# Patient Record
Sex: MALE | Race: WHITE | Employment: FULL TIME | RURAL
[De-identification: names, ages, dates, MRNs, and addresses within clinical notes are randomized per-mention and may not be internally consistent; named-entity substitution may affect disease eponyms.]

---

## 2017-01-11 ENCOUNTER — PATIENT OUTREACH (OUTPATIENT)
Dept: FAMILY MEDICINE CLINIC | Age: 32
End: 2017-01-11

## 2017-04-03 RX ORDER — METFORMIN HYDROCHLORIDE 500 MG/1
TABLET ORAL
Qty: 60 TAB | Refills: 0 | OUTPATIENT
Start: 2017-04-03

## 2017-04-03 NOTE — TELEPHONE ENCOUNTER
Patients spouse called and said he was supposed to come back in November but they completely forgot and now he has ran out of his Metformin. I explained since Dr. Roxie Zhou has not seen him in a while and he was supposed to come back then he may not refill it until he is seen. She would like to know if since she made him an appointment for Monday 04/10/17 if the Rx can be refilled. I told her I would ask. Please give her a call back if this can not be done at [phone].

## 2017-04-05 RX ORDER — METFORMIN HYDROCHLORIDE 500 MG/1
500 TABLET ORAL 2 TIMES DAILY WITH MEALS
Qty: 60 TAB | Refills: 0 | Status: SHIPPED | OUTPATIENT
Start: 2017-04-05 | End: 2017-04-10 | Stop reason: SDUPTHER

## 2017-04-10 ENCOUNTER — OFFICE VISIT (OUTPATIENT)
Dept: FAMILY MEDICINE CLINIC | Age: 32
End: 2017-04-10

## 2017-04-10 VITALS
RESPIRATION RATE: 14 BRPM | WEIGHT: 257 LBS | OXYGEN SATURATION: 98 % | BODY MASS INDEX: 34.06 KG/M2 | TEMPERATURE: 98 F | HEART RATE: 74 BPM | SYSTOLIC BLOOD PRESSURE: 136 MMHG | HEIGHT: 73 IN | DIASTOLIC BLOOD PRESSURE: 81 MMHG

## 2017-04-10 DIAGNOSIS — E11.9 CONTROLLED TYPE 2 DIABETES MELLITUS WITHOUT COMPLICATION, WITHOUT LONG-TERM CURRENT USE OF INSULIN (HCC): Primary | ICD-10-CM

## 2017-04-10 DIAGNOSIS — I10 ESSENTIAL HYPERTENSION: ICD-10-CM

## 2017-04-10 LAB — HBA1C MFR BLD HPLC: 6.1 %

## 2017-04-10 RX ORDER — METFORMIN HYDROCHLORIDE 500 MG/1
500 TABLET ORAL 2 TIMES DAILY WITH MEALS
Qty: 60 TAB | Refills: 5 | Status: SHIPPED | OUTPATIENT
Start: 2017-04-10 | End: 2018-04-30 | Stop reason: SDUPTHER

## 2017-04-10 RX ORDER — LISINOPRIL 10 MG/1
10 TABLET ORAL DAILY
Qty: 30 TAB | Refills: 5 | Status: SHIPPED | OUTPATIENT
Start: 2017-04-10 | End: 2017-08-07

## 2017-04-10 NOTE — PROGRESS NOTES
Reviewed record in preparation for visit and have necessary documentation      Body mass index is 33.91 kg/(m^2).     Health Maintenance Due   Topic Date Due    INFLUENZA AGE 9 TO ADULT  08/01/2016

## 2017-04-10 NOTE — MR AVS SNAPSHOT
Visit Information Date & Time Provider Department Dept. Phone Encounter #  
 4/10/2017  9:40 AM Eulalio Quigley MD [address] [place] [address] [id number] Follow-up Instructions Return in about 4 months (around 8/10/2017), or if symptoms worsen or fail to improve. Upcoming Health Maintenance Date Due INFLUENZA AGE 9 TO ADULT 8/1/2016 DTaP/Tdap/Td series (2 - Td) 10/6/2026 Allergies as of 4/10/2017  Review Complete On: 4/10/2017 By: Cuco Dwyer No Known Allergies Current Immunizations  Never Reviewed Name Date Tdap 10/6/2016 11:34 AM  
  
 Not reviewed this visit You Were Diagnosed With   
  
 Codes Comments Controlled type 2 diabetes mellitus without complication, without long-term current use of insulin ([facility]    -  Primary ICD-10-CM: E11.9 ICD-9-CM: 250.00 Essential hypertension     ICD-10-CM: I10 
ICD-9-CM: 401.9 Vitals BP Pulse Temp Resp Height(growth percentile) Weight(growth percentile) 136/81 74 98 °F (36.7 °C) (Oral) 14 6' 1\" (1.854 m) 257 lb (116.6 kg) SpO2 BMI Smoking Status 98% 33.91 kg/m2 Never Smoker Vitals History BMI and BSA Data Body Mass Index Body Surface Area  
 33.91 kg/m 2 2.45 m 2 Preferred Pharmacy Pharmacy Name Phone [facility] [address] [place] [address] [phone] Your Updated Medication List  
  
   
This list is accurate as of: 4/10/17 10:39 AM.  Always use your most recent med list.  
  
  
  
  
 glucose blood VI test strips strip Commonly known as:  Amalia Stephenson Use twice daily. Lancets Misc Commonly known as: One Touch Lauryn Rm Use twice daily. lisinopril 10 mg tablet Commonly known as:  Ricardo Mash Take 1 Tab by mouth daily. metFORMIN 500 mg tablet Commonly known as:  GLUCOPHAGE Take 1 Tab by mouth two (2) times daily (with meals). Prescriptions Sent to Pharmacy Refills  
 metFORMIN (GLUCOPHAGE) 500 mg tablet 5 Sig: Take 1 Tab by mouth two (2) times daily (with meals). Class: Normal  
 Pharmacy: [id number] [place] [address] Ph #: [phone] Route: Oral  
 lisinopril (PRINIVIL, ZESTRIL) 10 mg tablet 5 Sig: Take 1 Tab by mouth daily. Class: Normal  
 Pharmacy: [id number] [place] [address] Ph #: [phone] Route: Oral  
  
We Performed the Following AMB POC HEMOGLOBIN A1C [59896 CPT(R)] COLLECTION CAPILLARY BLOOD SPECIMEN [73618 CPT(R)] LIPID PANEL [51427 CPT(R)] METABOLIC PANEL, COMPREHENSIVE [17214 CPT(R)] TSH 3RD GENERATION [79704 CPT(R)] Follow-up Instructions Return in about 4 months (around 8/10/2017), or if symptoms worsen or fail to improve. Introducing [facility]! Elroy Vogt introduces theRightAPI patient portal. Now you can access parts of your medical record, email your doctor's office, and request medication refills online. 1. In your internet browser, go to [email or web address] 2. Click on the First Time User? Click Here link in the Sign In box. You will see the New Member Sign Up page. 3. Enter your theRightAPI Access Code exactly as it appears below. You will not need to use this code after youve completed the sign-up process. If you do not sign up before the expiration date, you must request a new code. · theRightAPI Access Code: [email or web address] Expires: 7/9/2017 10:37 AM 
 
4. Enter the last four digits of your Social Security Number (xxxx) and Date of Birth (mm/dd/yyyy) as indicated and click Submit. You will be taken to the next sign-up page. 5. Create a Groovesharkt ID. This will be your [employer] login ID and cannot be changed, so think of one that is secure and easy to remember. 6. Create a [employer]arkt password. You can change your password at any time. 7. Enter your Password Reset Question and Answer. This can be used at a later time if you forget your password. 8. Enter your e-mail address. You will receive e-mail notification when new information is available in [address] 9. Click Sign Up. You can now view and download portions of your medical record. 10. Click the Download Summary menu link to download a portable copy of your medical information. If you have questions, please visit the Frequently Asked Questions section of the [employer] website. Remember, [employer] is NOT to be used for urgent needs. For medical emergencies, dial 911. Now available from your iPhone and Android! Please provide this summary of care documentation to your next provider. Your primary care clinician is listed as Denver Der. If you have any questions after today's visit, please call [phone].

## 2017-04-11 LAB
ALBUMIN SERPL-MCNC: 4.9 G/DL (ref 3.5–5.5)
ALBUMIN/GLOB SERPL: 2 {RATIO} (ref 1.2–2.2)
ALP SERPL-CCNC: 61 IU/L (ref 39–117)
ALT SERPL-CCNC: 39 IU/L (ref 0–44)
AST SERPL-CCNC: 22 IU/L (ref 0–40)
BILIRUB SERPL-MCNC: 0.6 MG/DL (ref 0–1.2)
BUN SERPL-MCNC: 19 MG/DL (ref 6–20)
BUN/CREAT SERPL: 24 (ref 9–20)
CALCIUM SERPL-MCNC: 10 MG/DL (ref 8.7–10.2)
CHLORIDE SERPL-SCNC: 99 MMOL/L (ref 96–106)
CHOLEST SERPL-MCNC: 188 MG/DL (ref 100–199)
CO2 SERPL-SCNC: 26 MMOL/L (ref 18–29)
CREAT SERPL-MCNC: 0.78 MG/DL (ref 0.76–1.27)
GLOBULIN SER CALC-MCNC: 2.5 G/DL (ref 1.5–4.5)
GLUCOSE SERPL-MCNC: 134 MG/DL (ref 65–99)
HDLC SERPL-MCNC: 42 MG/DL
LDLC SERPL CALC-MCNC: 120 MG/DL (ref 0–99)
Lab: NORMAL
POTASSIUM SERPL-SCNC: 4.2 MMOL/L (ref 3.5–5.2)
PROT SERPL-MCNC: 7.4 G/DL (ref 6–8.5)
SODIUM SERPL-SCNC: 141 MMOL/L (ref 134–144)
TRIGL SERPL-MCNC: 128 MG/DL (ref 0–149)
TSH SERPL DL<=0.005 MIU/L-ACNC: 4.23 UIU/ML (ref 0.45–4.5)
VLDLC SERPL CALC-MCNC: 26 MG/DL (ref 5–40)

## 2017-04-24 NOTE — PATIENT INSTRUCTIONS

## 2017-04-24 NOTE — PROGRESS NOTES
Patient: Jayy Jean Baptiste MRN: [id number]  SSN: [id number]    YOB: 1985  Age: 33 y.o. Sex: male        Subjective:     Chief Complaint   Patient presents with    Medication Refill     fup       HPI: he is a 33y.o. year old male who presents for follow up of DM2 and HTN. Patient has not followed up as advised. Patient denies HA, dizziness, SOB, CP, abdominal pain, dysuria, myalgias or arthralgias. Lab Results   Component Value Date/Time    Hemoglobin A1c 11.0 10/05/2016 07:32 PM    Hemoglobin A1c (POC) 6.1 04/10/2017 10:21 AM          BP Readings from Last 3 Encounters:   04/10/17 136/81   10/12/16 (!) 138/99   10/06/16 (!) 156/99     Wt Readings from Last 3 Encounters:   04/10/17 257 lb (116.6 kg)   10/12/16 259 lb (117.5 kg)   10/05/16 265 lb (120.2 kg)     Body mass index is 33.91 kg/(m^2). Encounter Diagnoses   Name Primary?     Controlled type 2 diabetes mellitus without complication, without long-term current use of insulin (HCC) Yes    Essential hypertension        Lab Results   Component Value Date/Time    WBC 7.1 10/06/2016 04:08 AM    Hemoglobin (POC) 13.9 02/28/2011 11:02 AM    HGB 13.5 10/06/2016 04:08 AM    Hematocrit (POC) 41 02/28/2011 11:02 AM    HCT 38.7 10/06/2016 04:08 AM    PLATELET 403 13/06/6608 04:08 AM    MCV 88.6 10/06/2016 04:08 AM       Lab Results   Component Value Date/Time    Cholesterol, total 188 04/10/2017 10:46 AM    HDL Cholesterol 42 04/10/2017 10:46 AM    LDL, calculated 120 04/10/2017 10:46 AM    Triglyceride 128 04/10/2017 10:46 AM    CHOL/HDL Ratio 8.0 10/05/2016 11:56 PM       Lab Results   Component Value Date/Time    TSH 4.230 04/10/2017 10:46 AM      Lab Results   Component Value Date/Time    Hemoglobin A1c 11.0 10/05/2016 07:32 PM    Hemoglobin A1c (POC) 6.1 04/10/2017 10:21 AM        Current and past medical information:    Current Medications after this visit[de-identified]     Current Outpatient Prescriptions   Medication Sig    metFORMIN (GLUCOPHAGE) 500 mg tablet Take 1 Tab by mouth two (2) times daily (with meals).  lisinopril (PRINIVIL, ZESTRIL) 10 mg tablet Take 1 Tab by mouth daily.  glucose blood VI test strips (ONETOUCH VERIO) strip Use twice daily.  Lancets (ONE TOUCH DELICA) misc Use twice daily. No current facility-administered medications for this visit. Patient Active Problem List    Diagnosis Date Noted   Wali Cowaner 10/06/2016    Hyperglycemia 10/05/2016    Rib pain 02/24/2014    Erectile dysfunction 02/24/2014    HTN (hypertension) 11/07/2013    ADD (attention deficit disorder) 11/07/2013       Past Medical History:   Diagnosis Date    [facility] ([facility]     HTN (hypertension) 11/7/2013       No Known Allergies    History reviewed. No pertinent surgical history. Social History     Social History    Marital status: SINGLE     Spouse name: N/A    Number of children: N/A    Years of education: N/A     Social History Main Topics    Smoking status: Never Smoker    Smokeless tobacco: Never Used    Alcohol use No    Drug use: No    Sexual activity: Not Asked     Other Topics Concern    None     Social History Narrative         Objective:     Review of Systems:  Constitutional: Negative for fatigue or malaise  Derm: Negative for rash or lesion  HEENT: Negative for acute hearing or vision changes  Cardiovascular: Negative for dizziness, chest pain or palpitations  Respiratory: Negative for cough, wheezing or SOB  Gastreintestinal: Negative for nausea or abdominal pain  Genital/urinary: Negative for dysuria or voiding dysfunction  Muscoloskeletal: Negative for acute myalgias or arthralgias   Neurological: Negative for headache, weakness or paresthesia  Psychological: Negative for depression or anxiety      Vitals:    04/10/17 0951   BP: 136/81   Pulse: 74   Resp: 14   Temp: 98 °F (36.7 °C)   TempSrc: Oral   SpO2: 98%   Weight: 257 lb (116.6 kg)   Height: 6' 1\" (1.854 m)      Body mass index is 33.91 kg/(m^2).     Physical Exam:  Constitutional: well developed, well nourished, in no acute distress  Skin: warm and dry, normal tone and turgor  Head: normocephalic, atraumatic  Eyes: sclera clear, EOMI  Neck: normal range of motion  Cardiovascular: normal S1, S2, regular rate and rhythm  Respiratory: clear to auscultation bilaterally with symmetrical effort  Abdomen: soft, BS normal  Extremities: full range of motion  Neurology: normal strength and sensation  Psych: active, alert and oriented, affect appropriate     Health Maintenance Due   Topic Date Due    INFLUENZA AGE 9 TO ADULT  08/01/2016     Risk, benefits and potential costs of recommended health maintenance discussed. Patient expressed understanding and deferred at this time. Assessment and orders:       ICD-10-CM ICD-9-CM    1. Controlled type 2 diabetes mellitus without complication, without long-term current use of insulin (HCC) E11.9 250.00 AMB POC HEMOGLOBIN A1C      COLLECTION CAPILLARY BLOOD SPECIMEN      METABOLIC PANEL, COMPREHENSIVE      LIPID PANEL   2. Essential hypertension I10 401.9 lisinopril (PRINIVIL, ZESTRIL) 10 mg tablet      METABOLIC PANEL, COMPREHENSIVE      TSH 3RD GENERATION         Plan of care:  Diagnoses were discussed in detail with patient. Medication risks/benefits/side effects discussed with patient. Importance of compliance with all prescribed medications discussed. Including any new medications prescribed today. All of the patient's questions were addressed. The patient understands and agrees with our plan of care. The patient knows to call back if they are unsure of or forgets any changes we discussed today or if the symptoms change. The patient received an After-Visit Summary which contains VS, diagnoses, orders, allergy and medication lists. Over half of the 25 minutes face to face with Brunilda Hollins consisted of counseling and discussing treatment plans in reference to his DM2, HTN and weight loss.      Patient Care Team:  Vitcor M Ellis MD as PCP - General (Family Practice)  Marleni Sorto, WOLF as Ambulatory Care Navigator    Follow-up Disposition:  Return in about 4 months (around 8/10/2017), or if symptoms worsen or fail to improve. No future appointments.     Signed By: Victor M Ellis MD     April 23, 2017

## 2017-08-07 ENCOUNTER — APPOINTMENT (OUTPATIENT)
Dept: CT IMAGING | Age: 32
End: 2017-08-07
Attending: EMERGENCY MEDICINE
Payer: COMMERCIAL

## 2017-08-07 ENCOUNTER — HOSPITAL ENCOUNTER (EMERGENCY)
Age: 32
Discharge: HOME | End: 2017-08-07
Attending: EMERGENCY MEDICINE
Payer: COMMERCIAL

## 2017-08-07 VITALS
DIASTOLIC BLOOD PRESSURE: 91 MMHG | OXYGEN SATURATION: 93 % | HEART RATE: 85 BPM | BODY MASS INDEX: 36.22 KG/M2 | WEIGHT: 267.42 LBS | RESPIRATION RATE: 16 BRPM | TEMPERATURE: 98 F | SYSTOLIC BLOOD PRESSURE: 143 MMHG | HEIGHT: 72 IN

## 2017-08-07 DIAGNOSIS — R22.0 LEFT FACIAL SWELLING: Primary | ICD-10-CM

## 2017-08-07 DIAGNOSIS — K04.7 DENTAL INFECTION: ICD-10-CM

## 2017-08-07 LAB
ANION GAP BLD CALC-SCNC: 7 MMOL/L (ref 5–15)
BASOPHILS # BLD AUTO: 0 K/UL (ref 0–0.1)
BASOPHILS # BLD: 0 % (ref 0–1)
BUN SERPL-MCNC: 15 MG/DL (ref 6–20)
BUN/CREAT SERPL: 15 (ref 12–20)
CALCIUM SERPL-MCNC: 9.4 MG/DL (ref 8.5–10.1)
CHLORIDE SERPL-SCNC: 104 MMOL/L (ref 97–108)
CO2 SERPL-SCNC: 29 MMOL/L (ref 21–32)
CREAT SERPL-MCNC: 1.02 MG/DL (ref 0.7–1.3)
DIFFERENTIAL METHOD BLD: NORMAL
EOSINOPHIL # BLD: 0.2 K/UL (ref 0–0.4)
EOSINOPHIL NFR BLD: 1 % (ref 0–7)
ERYTHROCYTE [DISTWIDTH] IN BLOOD BY AUTOMATED COUNT: 12.1 % (ref 11.5–14.5)
GLUCOSE SERPL-MCNC: 174 MG/DL (ref 65–100)
HCT VFR BLD AUTO: 42.8 % (ref 36.6–50.3)
HGB BLD-MCNC: 15 G/DL (ref 12.1–17)
LYMPHOCYTES # BLD AUTO: 22 % (ref 12–49)
LYMPHOCYTES # BLD: 2.4 K/UL
MCH RBC QN AUTO: 31.6 PG (ref 26–34)
MCHC RBC AUTO-ENTMCNC: 35 G/DL (ref 30–36.5)
MCV RBC AUTO: 90.1 FL (ref 80–99)
MONOCYTES # BLD: 0.8 K/UL (ref 0–1)
MONOCYTES NFR BLD AUTO: 8 % (ref 5–13)
NEUTS SEG # BLD: 7.5 K/UL (ref 1.8–8)
NEUTS SEG NFR BLD AUTO: 69 % (ref 32–75)
PLATELET # BLD AUTO: 245 K/UL (ref 150–400)
POTASSIUM SERPL-SCNC: 4.4 MMOL/L (ref 3.5–5.1)
RBC # BLD AUTO: 4.75 M/UL (ref 4.1–5.7)
SODIUM SERPL-SCNC: 140 MMOL/L (ref 136–145)
WBC # BLD AUTO: 10.9 K/UL (ref 4.1–11.1)

## 2017-08-07 PROCEDURE — 96365 THER/PROPH/DIAG IV INF INIT: CPT

## 2017-08-07 PROCEDURE — 80048 BASIC METABOLIC PNL TOTAL CA: CPT | Performed by: EMERGENCY MEDICINE

## 2017-08-07 PROCEDURE — 74011636320 HC RX REV CODE- 636/320: Performed by: EMERGENCY MEDICINE

## 2017-08-07 PROCEDURE — 70491 CT SOFT TISSUE NECK W/DYE: CPT

## 2017-08-07 PROCEDURE — 74011250636 HC RX REV CODE- 250/636: Performed by: EMERGENCY MEDICINE

## 2017-08-07 PROCEDURE — 74011000258 HC RX REV CODE- 258: Performed by: EMERGENCY MEDICINE

## 2017-08-07 PROCEDURE — 36415 COLL VENOUS BLD VENIPUNCTURE: CPT | Performed by: EMERGENCY MEDICINE

## 2017-08-07 PROCEDURE — 85025 COMPLETE CBC W/AUTO DIFF WBC: CPT | Performed by: EMERGENCY MEDICINE

## 2017-08-07 PROCEDURE — 99284 EMERGENCY DEPT VISIT MOD MDM: CPT

## 2017-08-07 PROCEDURE — 96375 TX/PRO/DX INJ NEW DRUG ADDON: CPT

## 2017-08-07 PROCEDURE — 96361 HYDRATE IV INFUSION ADD-ON: CPT

## 2017-08-07 PROCEDURE — 74011000250 HC RX REV CODE- 250: Performed by: EMERGENCY MEDICINE

## 2017-08-07 RX ORDER — KETOROLAC TROMETHAMINE 30 MG/ML
30 INJECTION, SOLUTION INTRAMUSCULAR; INTRAVENOUS
Status: COMPLETED | OUTPATIENT
Start: 2017-08-07 | End: 2017-08-07

## 2017-08-07 RX ORDER — MORPHINE SULFATE 4 MG/ML
4 INJECTION, SOLUTION INTRAMUSCULAR; INTRAVENOUS
Status: COMPLETED | OUTPATIENT
Start: 2017-08-07 | End: 2017-08-07

## 2017-08-07 RX ORDER — ONDANSETRON 2 MG/ML
4 INJECTION INTRAMUSCULAR; INTRAVENOUS
Status: COMPLETED | OUTPATIENT
Start: 2017-08-07 | End: 2017-08-07

## 2017-08-07 RX ORDER — DIPHENHYDRAMINE HCL 25 MG
50 CAPSULE ORAL AS NEEDED
COMMUNITY
End: 2018-05-11 | Stop reason: ALTCHOICE

## 2017-08-07 RX ORDER — CLINDAMYCIN HYDROCHLORIDE 150 MG/1
300 CAPSULE ORAL 3 TIMES DAILY
Qty: 42 CAP | Refills: 0 | Status: SHIPPED | OUTPATIENT
Start: 2017-08-07 | End: 2017-08-11 | Stop reason: SDUPTHER

## 2017-08-07 RX ORDER — SODIUM CHLORIDE 0.9 % (FLUSH) 0.9 %
5-10 SYRINGE (ML) INJECTION AS NEEDED
Status: DISCONTINUED | OUTPATIENT
Start: 2017-08-07 | End: 2017-08-07 | Stop reason: HOSPADM

## 2017-08-07 RX ORDER — SODIUM CHLORIDE 0.9 % (FLUSH) 0.9 %
5-10 SYRINGE (ML) INJECTION EVERY 8 HOURS
Status: DISCONTINUED | OUTPATIENT
Start: 2017-08-07 | End: 2017-08-07 | Stop reason: HOSPADM

## 2017-08-07 RX ORDER — HYDROCODONE BITARTRATE AND ACETAMINOPHEN 7.5; 325 MG/1; MG/1
1 TABLET ORAL
Qty: 20 TAB | Refills: 0 | Status: SHIPPED | OUTPATIENT
Start: 2017-08-07 | End: 2018-05-11 | Stop reason: ALTCHOICE

## 2017-08-07 RX ADMIN — Medication 10 ML: at 06:54

## 2017-08-07 RX ADMIN — KETOROLAC TROMETHAMINE 30 MG: 30 INJECTION, SOLUTION INTRAMUSCULAR at 06:53

## 2017-08-07 RX ADMIN — IOPAMIDOL 100 ML: 612 INJECTION, SOLUTION INTRAVENOUS at 07:20

## 2017-08-07 RX ADMIN — SODIUM CHLORIDE 1000 ML: 900 INJECTION, SOLUTION INTRAVENOUS at 06:50

## 2017-08-07 RX ADMIN — CLINDAMYCIN PHOSPHATE 600 MG: 150 INJECTION, SOLUTION INTRAVENOUS at 07:27

## 2017-08-07 RX ADMIN — ONDANSETRON 4 MG: 2 INJECTION INTRAMUSCULAR; INTRAVENOUS at 06:51

## 2017-08-07 RX ADMIN — Medication 4 MG: at 06:54

## 2017-08-07 NOTE — ED NOTES
Bedside, Verbal and Written shift change report given to delia mullins by Rick Ratliff rn. Report included the following information SBAR, Kardex, ED Summary, [facility] and Recent Results.

## 2017-08-07 NOTE — DISCHARGE INSTRUCTIONS
Abscessed Tooth: Care Instructions  Your Care Instructions    An abscessed tooth is a tooth that has a pocket of pus in the tissues around it. Pus forms when the body tries to fight an infection caused by bacteria. If the pus cannot drain, it forms an abscess. An abscessed tooth can cause red, swollen gums and throbbing pain, especially when you chew. You may have a bad taste in your mouth and a fever, and your jaw may swell. Damage to the tooth, untreated tooth decay, or gum disease can cause an abscessed tooth. An abscessed tooth needs to be treated by a dental professional right away. If it is not treated, the infection could spread to other parts of your body. Your dentist will give you antibiotics to stop the infection. He or she may make a hole in the tooth or cut open (alex) the abscess inside your mouth so that the infection can drain, which should relieve your pain. You may need to have a root canal treatment, which tries to save your tooth by taking out the infected pulp and replacing it with a healing medicine and/or a filling. If these treatments do not work, your tooth may have to be removed. Follow-up care is a key part of your treatment and safety. Be sure to make and go to all appointments, and call your doctor if you are having problems. It's also a good idea to know your test results and keep a list of the medicines you take. How can you care for yourself at home? · Reduce pain and swelling in your face and jaw by putting ice or a cold pack on the outside of your cheek for 10 to 20 minutes at a time. Put a thin cloth between the ice and your skin. · Take pain medicines exactly as directed. ¨ If the doctor gave you a prescription medicine for pain, take it as prescribed. ¨ If you are not taking a prescription pain medicine, ask your doctor if you can take an over-the-counter medicine. · Take your antibiotics as directed. Do not stop taking them just because you feel better.  You need to take the full course of antibiotics. To prevent tooth abscess  · Brush and floss every day, and have regular dental checkups. · Eat a healthy diet, and avoid sugary foods and drinks. · Do not smoke, use e-cigarettes with nicotine, or use spit tobacco. Tobacco and nicotine slow your ability to heal. Tobacco also increases your risk for gum disease and cancer of the mouth and throat. If you need help quitting, talk to your doctor about stop-smoking programs and medicines. These can increase your chances of quitting for good. When should you call for help? Call 911 anytime you think you may need emergency care. For example, call if:  · You have trouble breathing. Call your doctor now or seek immediate medical care if:  · You have new or worse symptoms of infection, such as:  ¨ Increased pain, swelling, warmth, or redness. ¨ Red streaks leading from the area. ¨ Pus draining from the area. ¨ A fever. Watch closely for changes in your health, and be sure to contact your doctor if:  · You do not get better as expected. Where can you learn more? Go to http://jaswant-oneal.info/. Enter W287 in the search box to learn more about \"Abscessed Tooth: Care Instructions. \"  Current as of: September 19, 2016  Content Version: 11.3  © 7087-3464 [employer]. Care instructions adapted under license by [employer] (which disclaims liability or warranty for this information). If you have questions about a medical condition or this instruction, always ask your healthcare professional. [place] [address] any warranty or liability for your use of this information. We hope that we have addressed all of your medical concerns. The examination and treatment you received in the Emergency Department were for an emergent problem and were not intended as complete care. It is important that you follow up with your healthcare provider(s) for ongoing care.  If your symptoms worsen or do not improve as expected, and you are unable to reach your usual health care provider(s), you should return to the Emergency Department. Today's healthcare is undergoing tremendous change, and patient satisfaction surveys are one of the many tools to assess the quality of medical care. You may receive a survey from the [employer] regarding your experience in the Emergency Department. I hope that your experience has been completely positive, particularly the medical care that I provided. As such, please participate in the survey; anything less than excellent does not meet my expectations or intentions. [facility] and [facility] participate in nationally recognized quality of care measures. If your blood pressure is greater than 120/80, as reported below, we urge that you seek medical care to address the potential of high blood pressure, commonly known as hypertension. Hypertension can be hereditary or can be caused by certain medical conditions, pain, stress, or \"white coat syndrome. \"       Please make an appointment with your health care provider(s) for follow up of your Emergency Department visit. VITALS:   Patient Vitals for the past 8 hrs:   Temp Pulse Resp BP SpO2   08/07/17 0722 - - - 139/89 95 %   08/07/17 0702 - - - 147/88 94 %   08/07/17 0630 98 °F (36.7 °C) 85 16 (!) 142/92 97 %          Thank you for allowing us to provide you with medical care today. We realize that you have many choices for your emergency care needs. Please choose us in the future for any continued health care needs. Francee Cooks Ray Koch, [address]: [phone]            Recent Results (from the past 24 hour(s))   CBC WITH AUTOMATED DIFF    Collection Time: 08/07/17  6:46 AM   Result Value Ref Range    WBC 10.9 4.1 - 11.1 K/uL    RBC 4.75 4.10 - 5.70 M/uL    HGB 15.0 12.1 - 17.0 g/dL    HCT 42.8 36.6 - 50.3 %    MCV 90.1 80.0 - 99.0 FL    MCH 31.6 26.0 - 34.0 PG    MCHC 35.0 30.0 - 36.5 g/dL    RDW 12.1 11.5 - 14.5 %    PLATELET 085 656 - 032 K/uL    NEUTROPHILS 69 32 - 75 %    LYMPHOCYTES 22 12 - 49 %    MONOCYTES 8 5 - 13 %    EOSINOPHILS 1 0 - 7 %    BASOPHILS 0 0 - 1 %    ABS. NEUTROPHILS 7.5 1.8 - 8.0 K/UL    ABS. LYMPHOCYTES 2.4 K/UL    ABS. MONOCYTES 0.8 0.0 - 1.0 K/UL    ABS. EOSINOPHILS 0.2 0.0 - 0.4 K/UL    ABS. BASOPHILS 0.0 0.0 - 0.1 K/UL    DF AUTOMATED     METABOLIC PANEL, BASIC    Collection Time: 08/07/17  6:46 AM   Result Value Ref Range    Sodium 140 136 - 145 mmol/L    Potassium 4.4 3.5 - 5.1 mmol/L    Chloride 104 97 - 108 mmol/L    CO2 29 21 - 32 mmol/L    Anion gap 7 5 - 15 mmol/L    Glucose 174 (H) 65 - 100 mg/dL    BUN 15 6 - 20 MG/DL    Creatinine 1.02 0.70 - 1.30 MG/DL    BUN/Creatinine ratio 15 12 - 20      GFR est AA >60 >60 ml/min/1.73m2    GFR est non-AA >60 >60 ml/min/1.73m2    Calcium 9.4 8.5 - 10.1 MG/DL     Edited Result - FINAL (Exam End: 8/7/2017  7:19 AM) Open        Addendum      Cori Clarke MD   Mon Aug 7, 2017  8:03:40 AM EDT         Addendum: Impression should read: Soft tissue swelling centered at the left  investing fascia compatible with cellulitis with no evident underlying abscess  or other bony or soft tissue abnormality.          Study Result      EXAM:  CT NECK SOFT TISSUE W CONT     INDICATION:  left lower jaw swelling that started Sunday morning and is getting  worse.      COMPARISON: None.     CONTRAST: 100 mL of Isovue-300.     TECHNIQUE: Multislice helical CT was performed from the mid calvarium to the  aortic arch during uneventful rapid bolus intravenous contrast administration. Contiguous 2.5 mm axial images were reconstructed and lung and soft tissue  windows were generated. Coronal and sagittal reformations were generated.   CT  dose reduction was achieved through use of a standardized protocol tailored for  this examination and automatic exposure control for dose modulation.      FINDINGS:     There is stranding and edema centered around the investing fascia overlying the  left submandibular gland with associated prominent level 1 lymph nodes. No  collection or mass is identified. The adjacent muscular structures, mandible, 7  jugular line, and parotid gland appear unremarkable.     There is otherwise no mass or adenopathy identified within the neck.      The carotid and jugular vessels enhance normally.      The thyroid gland, submandibular salivary glands and parotid glands are  otherwise normal.     No nasopharyngeal, pharyngeal or laryngeal mass is identified.     No abnormalities are identified in the visualized portions of the brain or  orbits.      The visualized mastoid air cells and paranasal sinuses are clear.     The visualized portions of the lung apices are clear.     IMPRESSION  IMPRESSION: Soft tissue swelling centered at the left suggesting fracture  compatible with cellulitis with no evident underlying abscess or other bony or  soft tissue abnormality.

## 2017-08-07 NOTE — ED NOTES
7:05 AM  Change of shift. Care of patient taken over from Dr. Stephanie Saravia; H&P reviewed, handoff complete. Awaiting labs/imaging/consultant. 8:05am  Ct done, no abscess to drain, d/c with norco and clindamycin and refer to dentist for further care. Suspect dental infection from dental caries    Edited Result - FINAL (Exam End: 8/7/2017  7:19 AM) Open        Addendum      Franca Hurtado MD   Mon Aug 7, 2017  8:03:40 AM EDT         Addendum: Impression should read: Soft tissue swelling centered at the left  investing fascia compatible with cellulitis with no evident underlying abscess  or other bony or soft tissue abnormality.          Study Result      EXAM:  CT NECK SOFT TISSUE W CONT     INDICATION:  left lower jaw swelling that started Sunday morning and is getting  worse.      COMPARISON: None.     CONTRAST: 100 mL of Isovue-300.     TECHNIQUE: Multislice helical CT was performed from the mid calvarium to the  aortic arch during uneventful rapid bolus intravenous contrast administration. Contiguous 2.5 mm axial images were reconstructed and lung and soft tissue  windows were generated. Coronal and sagittal reformations were generated. CT  dose reduction was achieved through use of a standardized protocol tailored for  this examination and automatic exposure control for dose modulation.      FINDINGS:     There is stranding and edema centered around the investing fascia overlying the  left submandibular gland with associated prominent level 1 lymph nodes. No  collection or mass is identified.  The adjacent muscular structures, mandible, 7  jugular line, and parotid gland appear unremarkable.     There is otherwise no mass or adenopathy identified within the neck.      The carotid and jugular vessels enhance normally.      The thyroid gland, submandibular salivary glands and parotid glands are  otherwise normal.     No nasopharyngeal, pharyngeal or laryngeal mass is identified.     No abnormalities are identified in the visualized portions of the brain or  orbits.      The visualized mastoid air cells and paranasal sinuses are clear.     The visualized portions of the lung apices are clear.     IMPRESSION  IMPRESSION: Soft tissue swelling centered at the left suggesting fracture  compatible with cellulitis with no evident underlying abscess or other bony or  soft tissue abnormality.

## 2017-08-07 NOTE — ED TRIAGE NOTES
Pt ambulatory to treatment area c/o left jaw swelling that started Sunday Morning and is getting worse.  Denies injury

## 2017-08-07 NOTE — ED NOTES
Pt presents with wife with complaint of lower left jaw pain and swelling since Thursday night. Pt states that his condition started as dental pain and then area became numb and swollen. Pt denies fever and vomiting. Call bell within reach. Pt on monitor x2.

## 2017-08-07 NOTE — ED PROVIDER NOTES
HPI Comments: Pt is c/o left jaw swelling that started Sunday morning and is getting worse. Denies injury     Patient is a 33 y.o. male presenting with facial swelling. The history is provided by the patient and the spouse. No  was used. Facial Swelling    The incident occurred yesterday. He came to the ER via walk-in. The quality of the pain is described as dull and throbbing. The pain is at a severity of 6/10. The pain is moderate. The pain has been worsening since the injury. Pertinent negatives include no numbness, no blurred vision, no vomiting, no tinnitus, no disorientation, no weakness and no memory loss. He has tried NSAID for the symptoms. The treatment provided no relief. Past Medical History:   Diagnosis Date    Diabetes ([address]     HTN (hypertension) 11/7/2013       History reviewed. No pertinent surgical history. Family History:   Problem Relation Age of Onset    Hypertension Mother     Psychiatric Disorder Mother     Thyroid Disease Mother     Thyroid Disease Father        Social History     Social History    Marital status:      Spouse name: N/A    Number of children: N/A    Years of education: N/A     Occupational History    Not on file. Social History Main Topics    Smoking status: Never Smoker    Smokeless tobacco: Never Used    Alcohol use No    Drug use: No    Sexual activity: Not on file     Other Topics Concern    Not on file     Social History Narrative     ALLERGIES: Review of patient's allergies indicates no known allergies. Review of Systems   Constitutional: Negative for appetite change, chills, fever and unexpected weight change. HENT: Positive for facial swelling and trouble swallowing. Negative for ear pain, hearing loss, rhinorrhea, sore throat and tinnitus. Eyes: Negative for blurred vision, pain and visual disturbance. Respiratory: Negative for cough, chest tightness and shortness of breath.     Cardiovascular: Negative for chest pain and palpitations. Gastrointestinal: Negative for abdominal distention, abdominal pain, blood in stool and vomiting. Genitourinary: Negative for dysuria, hematuria and urgency. Musculoskeletal: Negative for back pain and myalgias. Skin: Negative for rash. Neurological: Negative for dizziness, syncope, weakness and numbness. Psychiatric/Behavioral: Negative for confusion, memory loss and suicidal ideas. All other systems reviewed and are negative. Vitals:    08/07/17 0630   BP: (!) 142/92   Pulse: 85   Resp: 16   Temp: 98 °F (36.7 °C)   SpO2: 97%   Weight: 121.3 kg (267 lb 6.7 oz)   Height: 6' (1.829 m)            Physical Exam   Constitutional: He is oriented to person, place, and time. He appears well-developed and well-nourished. No distress. HENT:   Head: Normocephalic and atraumatic. Right Ear: Hearing, tympanic membrane, external ear and ear canal normal.   Left Ear: Hearing, tympanic membrane, external ear and ear canal normal.   Nose: Nose normal.   Mouth/Throat: Uvula is midline, oropharynx is clear and moist and mucous membranes are normal. Mucous membranes are not dry. There is trismus in the jaw. Dental caries present. No uvula swelling. No oropharyngeal exudate. Eyes: Conjunctivae and EOM are normal. Pupils are equal, round, and reactive to light. Right eye exhibits no discharge. Left eye exhibits no discharge. No scleral icterus. Neck: Normal range of motion. Neck supple. No JVD present. No tracheal deviation present. Cardiovascular: Normal rate, regular rhythm, normal heart sounds and intact distal pulses. Exam reveals no gallop and no friction rub. No murmur heard. Pulmonary/Chest: Effort normal and breath sounds normal. No stridor. No respiratory distress. He has no decreased breath sounds. He has no wheezes. He has no rhonchi. He has no rales. He exhibits no tenderness. Abdominal: Soft.  Bowel sounds are normal. He exhibits no distension. There is no tenderness. There is no rebound and no guarding. Musculoskeletal: Normal range of motion. He exhibits no edema or tenderness. Lymphadenopathy:     He has cervical adenopathy (left anterior cervical). Neurological: He is alert and oriented to person, place, and time. He has normal strength and normal reflexes. No cranial nerve deficit or sensory deficit. He exhibits normal muscle tone. Coordination normal. GCS eye subscore is 4. GCS verbal subscore is 5. GCS motor subscore is 6. Skin: Skin is warm and dry. No rash noted. He is not diaphoretic. No erythema. No pallor. Psychiatric: He has a normal mood and affect. His behavior is normal. Judgment and thought content normal.   Nursing note and vitals reviewed. MDM  Number of Diagnoses or Management Options  Left facial swelling:      Amount and/or Complexity of Data Reviewed  Clinical lab tests: ordered  Tests in the radiology section of CPT®: ordered    Risk of Complications, Morbidity, and/or Mortality  Presenting problems: moderate  Diagnostic procedures: moderate  Management options: moderate    Patient Progress  Patient progress: stable    ED Course       Procedures  Chief Complaint   Patient presents with    Facial Swelling       6:57 AM  The patients presenting problems have been discussed, and they are in agreement with the care plan formulated and outlined with them. I have encouraged them to ask questions as they arise throughout their visit.     MEDICATIONS GIVEN:  Medications   sodium chloride (NS) flush 5-10 mL (not administered)   sodium chloride (NS) flush 5-10 mL (10 mL IntraVENous Given 8/7/17 0654)   sodium chloride 0.9 % bolus infusion 1,000 mL (1,000 mL IntraVENous New Bag 8/7/17 0650)   iopamidol (ISOVUE 300) 61 % contrast injection 100 mL (not administered)   ketorolac (TORADOL) injection 30 mg (30 mg IntraVENous Given 8/7/17 0653)   ondansetron (ZOFRAN) injection 4 mg (4 mg IntraVENous Given 8/7/17 0651) morphine injection 4 mg (4 mg IntraVENous Given 8/7/17 0654)       LABS REVIEWED:  Recent Results (from the past 24 hour(s))   CBC WITH AUTOMATED DIFF    Collection Time: 08/07/17  6:46 AM   Result Value Ref Range    WBC 10.9 4.1 - 11.1 K/uL    RBC 4.75 4.10 - 5.70 M/uL    HGB 15.0 12.1 - 17.0 g/dL    HCT 42.8 36.6 - 50.3 %    MCV 90.1 80.0 - 99.0 FL    MCH 31.6 26.0 - 34.0 PG    MCHC 35.0 30.0 - 36.5 g/dL    RDW 12.1 11.5 - 14.5 %    PLATELET 079 025 - 511 K/uL    NEUTROPHILS 69 32 - 75 %    LYMPHOCYTES 22 12 - 49 %    MONOCYTES 8 5 - 13 %    EOSINOPHILS 1 0 - 7 %    BASOPHILS 0 0 - 1 %    ABS. NEUTROPHILS 7.5 1.8 - 8.0 K/UL    ABS. LYMPHOCYTES 2.4 K/UL    ABS. MONOCYTES 0.8 0.0 - 1.0 K/UL    ABS. EOSINOPHILS 0.2 0.0 - 0.4 K/UL    ABS. BASOPHILS 0.0 0.0 - 0.1 K/UL    DF AUTOMATED         VITAL SIGNS:  Patient Vitals for the past 12 hrs:   Temp Pulse Resp BP SpO2   08/07/17 0630 98 °F (36.7 °C) 85 16 (!) 142/92 97 %       RADIOLOGY RESULTS:  The following have been ordered and reviewed:  CT NECK SOFT TISSUE W CONT    (Results Pending)     PROGRESS NOTES:  6:57 AM  Change of shift. Care of patient signed over to Dr. Kirit Leong. Handoff complete. DIAGNOSIS:    1. Left facial swelling        PLAN:  Final disposition will be made by on coming physician. ED COURSE: The patients hospital course has been uncomplicated.

## 2017-08-11 ENCOUNTER — HOSPITAL ENCOUNTER (INPATIENT)
Age: 32
LOS: 4 days | Discharge: HOME | DRG: 159 | End: 2017-08-15
Attending: INTERNAL MEDICINE | Admitting: FAMILY MEDICINE
Payer: COMMERCIAL

## 2017-08-11 ENCOUNTER — APPOINTMENT (OUTPATIENT)
Dept: CT IMAGING | Age: 32
End: 2017-08-11
Attending: PHYSICIAN ASSISTANT
Payer: COMMERCIAL

## 2017-08-11 ENCOUNTER — OFFICE VISIT (OUTPATIENT)
Dept: FAMILY MEDICINE CLINIC | Age: 32
End: 2017-08-11

## 2017-08-11 ENCOUNTER — HOSPITAL ENCOUNTER (EMERGENCY)
Age: 32
Discharge: ANOTHER HOSPITAL | End: 2017-08-11
Attending: EMERGENCY MEDICINE
Payer: COMMERCIAL

## 2017-08-11 VITALS
WEIGHT: 257.6 LBS | RESPIRATION RATE: 20 BRPM | SYSTOLIC BLOOD PRESSURE: 145 MMHG | BODY MASS INDEX: 34.89 KG/M2 | TEMPERATURE: 97.2 F | OXYGEN SATURATION: 98 % | DIASTOLIC BLOOD PRESSURE: 95 MMHG | HEIGHT: 72 IN | HEART RATE: 120 BPM

## 2017-08-11 VITALS
DIASTOLIC BLOOD PRESSURE: 76 MMHG | WEIGHT: 257.6 LBS | RESPIRATION RATE: 16 BRPM | SYSTOLIC BLOOD PRESSURE: 138 MMHG | TEMPERATURE: 97.9 F | BODY MASS INDEX: 34.89 KG/M2 | OXYGEN SATURATION: 95 % | HEART RATE: 98 BPM | HEIGHT: 72 IN

## 2017-08-11 DIAGNOSIS — L02.01 FACIAL ABSCESS: Primary | ICD-10-CM

## 2017-08-11 DIAGNOSIS — R22.0 FACIAL SWELLING: ICD-10-CM

## 2017-08-11 DIAGNOSIS — R51.9 LEFT-SIDED FACE PAIN: ICD-10-CM

## 2017-08-11 DIAGNOSIS — K12.2 ABSCESS OR CELLULITIS, ORAL SOFT TISSUE: Primary | ICD-10-CM

## 2017-08-11 DIAGNOSIS — R73.9 HYPERGLYCEMIA: ICD-10-CM

## 2017-08-11 DIAGNOSIS — E11.9 CONTROLLED TYPE 2 DIABETES MELLITUS WITHOUT COMPLICATION, WITHOUT LONG-TERM CURRENT USE OF INSULIN (HCC): ICD-10-CM

## 2017-08-11 DIAGNOSIS — R11.0 NAUSEA: ICD-10-CM

## 2017-08-11 DIAGNOSIS — R00.0 TACHYCARDIA: ICD-10-CM

## 2017-08-11 LAB
ANION GAP BLD CALC-SCNC: 7 MMOL/L (ref 5–15)
BASOPHILS # BLD AUTO: 0 K/UL (ref 0–0.1)
BASOPHILS # BLD: 1 % (ref 0–1)
BUN SERPL-MCNC: 18 MG/DL (ref 6–20)
BUN/CREAT SERPL: 16 (ref 12–20)
CALCIUM SERPL-MCNC: 10.1 MG/DL (ref 8.5–10.1)
CHLORIDE SERPL-SCNC: 98 MMOL/L (ref 97–108)
CO2 SERPL-SCNC: 30 MMOL/L (ref 21–32)
CREAT SERPL-MCNC: 1.14 MG/DL (ref 0.7–1.3)
DIFFERENTIAL METHOD BLD: ABNORMAL
EOSINOPHIL # BLD: 0.1 K/UL (ref 0–0.4)
EOSINOPHIL NFR BLD: 1 % (ref 0–7)
ERYTHROCYTE [DISTWIDTH] IN BLOOD BY AUTOMATED COUNT: 12.3 % (ref 11.5–14.5)
GLUCOSE BLD STRIP.AUTO-MCNC: 144 MG/DL (ref 65–100)
GLUCOSE BLD STRIP.AUTO-MCNC: 240 MG/DL (ref 65–100)
GLUCOSE SERPL-MCNC: 243 MG/DL (ref 65–100)
HBA1C MFR BLD HPLC: 6.6 %
HCT VFR BLD AUTO: 45.6 % (ref 36.6–50.3)
HGB BLD-MCNC: 16 G/DL (ref 12.1–17)
LYMPHOCYTES # BLD AUTO: 12 % (ref 12–49)
LYMPHOCYTES # BLD: 0.8 K/UL
MCH RBC QN AUTO: 31.5 PG (ref 26–34)
MCHC RBC AUTO-ENTMCNC: 35.1 G/DL (ref 30–36.5)
MCV RBC AUTO: 89.8 FL (ref 80–99)
MONOCYTES # BLD: 0.6 K/UL (ref 0–1)
MONOCYTES NFR BLD AUTO: 9 % (ref 5–13)
NEUTS SEG # BLD: 5.1 K/UL (ref 1.8–8)
NEUTS SEG NFR BLD AUTO: 77 % (ref 32–75)
PLATELET # BLD AUTO: 289 K/UL (ref 150–400)
POTASSIUM SERPL-SCNC: 4.3 MMOL/L (ref 3.5–5.1)
RBC # BLD AUTO: 5.08 M/UL (ref 4.1–5.7)
SERVICE CMNT-IMP: ABNORMAL
SERVICE CMNT-IMP: ABNORMAL
SODIUM SERPL-SCNC: 135 MMOL/L (ref 136–145)
WBC # BLD AUTO: 6.6 K/UL (ref 4.1–11.1)

## 2017-08-11 PROCEDURE — 74011636320 HC RX REV CODE- 636/320: Performed by: EMERGENCY MEDICINE

## 2017-08-11 PROCEDURE — 96367 TX/PROPH/DG ADDL SEQ IV INF: CPT

## 2017-08-11 PROCEDURE — 70491 CT SOFT TISSUE NECK W/DYE: CPT

## 2017-08-11 PROCEDURE — 65270000029 HC RM PRIVATE

## 2017-08-11 PROCEDURE — 96376 TX/PRO/DX INJ SAME DRUG ADON: CPT

## 2017-08-11 PROCEDURE — 36415 COLL VENOUS BLD VENIPUNCTURE: CPT | Performed by: PHYSICIAN ASSISTANT

## 2017-08-11 PROCEDURE — 99285 EMERGENCY DEPT VISIT HI MDM: CPT

## 2017-08-11 PROCEDURE — 82962 GLUCOSE BLOOD TEST: CPT

## 2017-08-11 PROCEDURE — 96375 TX/PRO/DX INJ NEW DRUG ADDON: CPT

## 2017-08-11 PROCEDURE — 74011000258 HC RX REV CODE- 258: Performed by: PHYSICIAN ASSISTANT

## 2017-08-11 PROCEDURE — 96365 THER/PROPH/DIAG IV INF INIT: CPT

## 2017-08-11 PROCEDURE — 74011250636 HC RX REV CODE- 250/636: Performed by: PHYSICIAN ASSISTANT

## 2017-08-11 PROCEDURE — 85025 COMPLETE CBC W/AUTO DIFF WBC: CPT | Performed by: PHYSICIAN ASSISTANT

## 2017-08-11 PROCEDURE — 80048 BASIC METABOLIC PNL TOTAL CA: CPT | Performed by: PHYSICIAN ASSISTANT

## 2017-08-11 PROCEDURE — 96361 HYDRATE IV INFUSION ADD-ON: CPT

## 2017-08-11 RX ORDER — MORPHINE SULFATE 4 MG/ML
6 INJECTION, SOLUTION INTRAMUSCULAR; INTRAVENOUS
Status: COMPLETED | OUTPATIENT
Start: 2017-08-11 | End: 2017-08-11

## 2017-08-11 RX ORDER — MORPHINE SULFATE 2 MG/ML
6 INJECTION, SOLUTION INTRAMUSCULAR; INTRAVENOUS
Status: COMPLETED | OUTPATIENT
Start: 2017-08-11 | End: 2017-08-11

## 2017-08-11 RX ORDER — KETOROLAC TROMETHAMINE 30 MG/ML
15 INJECTION, SOLUTION INTRAMUSCULAR; INTRAVENOUS
Status: COMPLETED | OUTPATIENT
Start: 2017-08-11 | End: 2017-08-11

## 2017-08-11 RX ORDER — CLINDAMYCIN HYDROCHLORIDE 150 MG/1
300 CAPSULE ORAL 4 TIMES DAILY
Qty: 56 CAP | Refills: 0 | Status: ON HOLD
Start: 2017-08-11 | End: 2017-08-15

## 2017-08-11 RX ORDER — CLINDAMYCIN PHOSPHATE 900 MG/50ML
900 INJECTION INTRAVENOUS
Status: COMPLETED | OUTPATIENT
Start: 2017-08-11 | End: 2017-08-11

## 2017-08-11 RX ORDER — ONDANSETRON 8 MG/1
8 TABLET, ORALLY DISINTEGRATING ORAL
Qty: 1 TAB | Refills: 0
Start: 2017-08-11 | End: 2017-08-15

## 2017-08-11 RX ORDER — ONDANSETRON 2 MG/ML
4 INJECTION INTRAMUSCULAR; INTRAVENOUS
Status: COMPLETED | OUTPATIENT
Start: 2017-08-11 | End: 2017-08-11

## 2017-08-11 RX ORDER — OXYCODONE AND ACETAMINOPHEN 10; 325 MG/1; MG/1
TABLET ORAL
Status: ON HOLD | COMMUNITY
End: 2017-08-15

## 2017-08-11 RX ADMIN — IOPAMIDOL 100 ML: 612 INJECTION, SOLUTION INTRAVENOUS at 18:13

## 2017-08-11 RX ADMIN — ONDANSETRON 4 MG: 2 INJECTION INTRAMUSCULAR; INTRAVENOUS at 20:45

## 2017-08-11 RX ADMIN — ONDANSETRON 4 MG: 2 INJECTION INTRAMUSCULAR; INTRAVENOUS at 16:36

## 2017-08-11 RX ADMIN — SODIUM CHLORIDE 3 G: 900 INJECTION, SOLUTION INTRAVENOUS at 19:26

## 2017-08-11 RX ADMIN — KETOROLAC TROMETHAMINE 15 MG: 30 INJECTION, SOLUTION INTRAMUSCULAR at 16:36

## 2017-08-11 RX ADMIN — Medication 6 MG: at 16:37

## 2017-08-11 RX ADMIN — CLINDAMYCIN PHOSPHATE 900 MG: 18 INJECTION, SOLUTION INTRAMUSCULAR; INTRAVENOUS at 16:14

## 2017-08-11 RX ADMIN — SODIUM CHLORIDE 1000 ML: 900 INJECTION, SOLUTION INTRAVENOUS at 16:14

## 2017-08-11 RX ADMIN — SODIUM CHLORIDE 1000 ML: 900 INJECTION, SOLUTION INTRAVENOUS at 17:27

## 2017-08-11 RX ADMIN — MORPHINE SULFATE 6 MG: 2 INJECTION, SOLUTION INTRAMUSCULAR; INTRAVENOUS at 20:46

## 2017-08-11 NOTE — ED NOTES
Patient updated on plan of care. Verbalized understanding. Stretcher in lowest position possible, brakes locked, with side rails elevated. Patient on monitor x2. Call bell within reach. Will continue to monitor.

## 2017-08-11 NOTE — ED NOTES
Patient updated on plan of care. Verbalized understanding. Stretcher in lowest position possible, brakes locked, with side rails elevated. Patient on monitor x2. Call bell within reach. Will continue to monitor. Explained time expectation for IV medications and blood work. Patient provided with pillow and warm blanket. Patient's wife provided with warm blanket. Alvaro VILLANUEVA placed orders for pain medication. Will administered once approved by pharmacy. Rambo VILLANUEVA attempting to call patient's oral surgeon. Message left for surgeon to call back.

## 2017-08-11 NOTE — ED TRIAGE NOTES
Patient ambulatory to ED treatment area with steady gait for complaint of \"he started with the pain last Friday/Saturday. Monday morning we came to the ER and he was given oral antibiotics to home on. He was then seen by an oral surgeon and the antibiotic was increased and he was given more pain medication. The oral surgeon told us to come here today for IV antibiotics. \" Patient's wife is telling story for patient during triage due to pain and swelling in mouth.

## 2017-08-11 NOTE — ED NOTES
Purposeful rounding done. Pt sitting up on stretcher. Offered assist with any needs. Pt states \"pain level 4 and no needs at this time. \" Call bell in reach will continue to monitor.

## 2017-08-11 NOTE — IP AVS SNAPSHOT
[address] 
[phone] Patient: Manish Demarco MRN: [id number] :1985 You are allergic to the following No active allergies Recent Documentation Height Weight BMI Smoking Status 1.854 m 117.8 kg 34.28 kg/m2 Never Smoker Emergency Contacts  (Rel.) Home Phone Work Phone Mobile Phone Nallely Anderson (Spouse) [phone] -- --  
 CaseCeleste (Daughter) [phone] -- [phone] About your hospitalization You were admitted on:  2017 You last received care in the:  [facility] You were discharged on:  August 15, 2017 Why you were hospitalized Your primary diagnosis was:  Facial Abscess Your diagnoses also included:  Left-Sided Face Pain, Left Facial Swelling Providers Seen During Your Hospitalizations Provider Role Specialty Primary office phone Basil Humphreys MD Attending Provider Internal Medicine [phone] Chela Mcgovern MD Attending Provider Internal Medicine [phone] Oni Cuellar MD Attending Provider Internal Medicine [phone] Keshia Art MD Attending Provider Internal Medicine [phone] Your Primary Care Physician (PCP) Primary Care Physician Office Phone Office Fax Mariza HAQUE [phone] Follow-up Information Follow up With Details Comments Contact Info Stefan Toribio MD In 1 week  [address] [place] [id number] [phone] Meliton Howard MD In 1 week  [facility] and [address] [place] [address] 
[phone] Current Discharge Medication List  
  
START taking these medications Dose & Instructions Dispensing Information Comments Morning Noon Evening Bedtime L. acidoph & paracasei- S therm- Bifido 8 billion cell Cap cap Commonly known as:  DAKOTA-Q/RISAQUAD Your last dose was: Your next dose is:    
   
   
 Dose:  1 Cap Take 1 Cap by mouth daily. Quantity:  15 Cap Refills:  0 CONTINUE these medications which have CHANGED Dose & Instructions Dispensing Information Comments Morning Noon Evening Bedtime  
 oxyCODONE-acetaminophen  mg per tablet Commonly known as:  PERCOCET 10 What changed:   
- how much to take - when to take this 
- reasons to take this Your last dose was: Your next dose is:    
   
   
 Dose:  1 Tab Take 1 Tab by mouth every six (6) hours as needed for Pain. Max Daily Amount: 4 Tabs. Quantity:  10 Tab Refills:  0 CONTINUE these medications which have NOT CHANGED Dose & Instructions Dispensing Information Comments Morning Noon Evening Bedtime BENADRYL 25 mg capsule Generic drug:  diphenhydrAMINE Your last dose was: Your next dose is:    
   
   
 Dose:  50 mg Take 50 mg by mouth as needed. Refills:  0  
     
   
   
   
  
 clindamycin 150 mg capsule Commonly known as:  CLEOCIN Your last dose was: Your next dose is:    
   
   
 Dose:  300 mg Take 2 Caps by mouth four (4) times daily for 7 days. Quantity:  56 Cap Refills:  0  
     
   
   
   
  
 glucose blood VI test strips strip Commonly known as:  Leno Hobbs Your last dose was: Your next dose is:    
   
   
 Use twice daily. Quantity:  50 Strip Refills:  1 HYDROcodone-acetaminophen 7.5-325 mg per tablet Commonly known as:  Rocky Payne Your last dose was: Your next dose is:    
   
   
 Dose:  1 Tab Take 1 Tab by mouth every six (6) hours as needed for Pain. Max Daily Amount: 4 Tabs. Do not drive for 6 hours after taking, may impair ability to drive Quantity:  20 Tab Refills:  0  
     
   
   
   
  
 ibuprofen 100 mg tablet Your last dose was: Your next dose is:    
   
   
 Dose:  200 mg Take 200 mg by mouth as needed for Pain. Refills:  0 Lancets Misc Commonly known as: One Touch Darol Guitar Your last dose was: Your next dose is:    
   
   
 Use twice daily. Quantity:  1 Each Refills:  1  
     
   
   
   
  
 metFORMIN 500 mg tablet Commonly known as:  GLUCOPHAGE Your last dose was: Your next dose is:    
   
   
 Dose:  500 mg Take 1 Tab by mouth two (2) times daily (with meals). Quantity:  60 Tab Refills:  5 STOP taking these medications   
 ondansetron 8 mg disintegrating tablet Commonly known as:  ZOFRAN ODT Where to Get Your Medications Information on where to get these meds will be given to you by the nurse or doctor. ! Ask your nurse or doctor about these medications  
  clindamycin 150 mg capsule L. acidoph & paracasei- S therm- Bifido 8 billion cell Cap cap  
 oxyCODONE-acetaminophen  mg per tablet Discharge Instructions Discharge Instructions PATIENT ID: Jazmin Romero MRN: [id number] YOB: 1985 DATE OF ADMISSION: 8/11/2017 11:09 PM   
DATE OF DISCHARGE: 8/15/2017 PRIMARY CARE PROVIDER: Megan Muller MD  
 
ATTENDING PHYSICIAN: Edel De La Cruz MD 
DISCHARGING PROVIDER: Edel De La Cruz MD   
To contact this individual call [phone] and ask the  to page. If unavailable ask to be transferred the Adult Hospitalist Department. DISCHARGE DIAGNOSES Facial cellulitis/abscess CONSULTATIONS: IP CONSULT TO OTOLARYNGOLOGY PROCEDURES/SURGERIES: * No surgery found * PENDING TEST RESULTS:  
At the time of discharge the following test results are still pending: none FOLLOW UP APPOINTMENTS:  
Follow-up Information Follow up With Details Comments Contact Info Megan Muller MD In 1 week  [address] [facility] [address] [place] [id number] [phone] Jeanne Garcia MD In 1 week  [facility] and [address] [place] [address] Luis  [address] 
[phone] ADDITIONAL CARE RECOMMENDATIONS:  
Follow up with ENT in one week DIET: Diabetic Diet ACTIVITY: Activity as tolerated DISCHARGE MEDICATIONS: 
 See Medication Reconciliation Form · It is important that you take the medication exactly as they are prescribed. · Keep your medication in the bottles provided by the pharmacist and keep a list of the medication names, dosages, and times to be taken in your wallet. · Do not take other medications without consulting your doctor. NOTIFY YOUR PHYSICIAN FOR ANY OF THE FOLLOWING:  
Fever over 101 degrees for 24 hours. Chest pain, shortness of breath, fever, chills, nausea, vomiting, diarrhea, change in mentation, falling, weakness, bleeding. Severe pain or pain not relieved by medications. Or, any other signs or symptoms that you may have questions about. DISPOSITION: 
 x Home With: 
 OT  PT  [place]  RN  
  
 SNF/Inpatient Rehab/LTAC Independent/assisted living Hospice Other: CDMP Checked:  
Yes x PROBLEM LIST Updated: 
Yes x Signed:  
Beth Campos MD 
8/15/2017 11:40 AM 
 
Discharge Orders None Nursing Home Quality Announcement We are excited to announce that we are making your provider's discharge notes available to you in Nursing Home Quality. You will see these notes when they are completed and signed by the physician that discharged you from your recent hospital stay. If you have any questions or concerns about any information you see in Nursing Home Quality, please call the Health Information Department where you were seen or reach out to your Primary Care Provider for more information about your plan of care. Introducing [facility]!    
 [facility] introduces Nursing Home Quality patient portal. Now you can access parts of your medical record, email your doctor's office, and request medication refills online. 1. In your internet browser, go to [email or web address] 2. Click on the First Time User? Click Here link in the Sign In box. You will see the New Member Sign Up page. 3. Enter your [employer] Access Code exactly as it appears below. You will not need to use this code after youve completed the sign-up process. If you do not sign up before the expiration date, you must request a new code. · [employer] Access Code: [email or web address] Expires: 11/5/2017  8:07 AM 
 
4. Enter the last four digits of your Social Security Number (xxxx) and Date of Birth (mm/dd/yyyy) as indicated and click Submit. You will be taken to the next sign-up page. 5. Create a Pandoo TEK ID. This will be your Pand[employer] TEK login ID and cannot be changed, so think of one that is secure and easy to remember. 6. Create a Pand[employer] TEK password. You can change your password at any time. 7. Enter your Password Reset Question and Answer. This can be used at a later time if you forget your password. 8. Enter your e-mail address. You will receive e-mail notification when new information is available in [address] 9. Click Sign Up. You can now view and download portions of your medical record. 10. Click the Download Summary menu link to download a portable copy of your medical information. If you have questions, please visit the Frequently Asked Questions section of the [employer] website. Remember, [employer] is NOT to be used for urgent needs. For medical emergencies, dial 911. Now available from your iPhone and Android! General Information Please provide this summary of care documentation to your next provider. Patient Signature:  ____________________________________________________________ Date:  ____________________________________________________________  
  
[facility]  Provider Signature: ____________________________________________________________ Date:  ____________________________________________________________

## 2017-08-11 NOTE — ED NOTES
Bedside shift change report given to YOUSUF Ellis RN (oncoming nurse) by Colonel Izabela cMnamara RN (offgoing nurse). Report included the following information SBAR.

## 2017-08-11 NOTE — MR AVS SNAPSHOT
Visit Information Date & Time Provider Department Dept. Phone Encounter #  
 8/11/2017  1:40 PM Petty Colón  [facility] [phone] [id number] Your Appointments 8/15/2017  2:20 PM  
ROUTINE CARE with Petty Colón MD  
[address] [facility] [address] [facility]) Appt Note: 4 month f/u  
 [address] [address]  
[facility] [address] [place] [address] Upcoming Health Maintenance Date Due INFLUENZA AGE 9 TO ADULT 8/1/2017 DTaP/Tdap/Td series (2 - Td) 10/6/2026 Allergies as of 8/11/2017  Review Complete On: 8/11/2017 By: Jasson Del Valle No Known Allergies Current Immunizations  Never Reviewed Name Date Tdap 10/6/2016 11:34 AM  
  
 Not reviewed this visit You Were Diagnosed With   
  
 Codes Comments Abscess or cellulitis, oral soft tissue    -  Primary ICD-10-CM: K12.2 ICD-9-CM: 528.3 Hyperglycemia     ICD-10-CM: R73.9 ICD-9-CM: 790.29 Nausea     ICD-10-CM: R11.0 ICD-9-CM: 787.02 Vitals BP Pulse Temp Resp Height(growth percentile) Weight(growth percentile) (!) 145/95 (!) 120 97.2 °F (36.2 °C) (Oral) 20 6' (1.829 m) 257 lb 9.6 oz (116.8 kg) SpO2 BMI Smoking Status 98% 34.94 kg/m2 Never Smoker Vitals History BMI and BSA Data Body Mass Index Body Surface Area 34.94 kg/m 2 2.44 m 2 Preferred Pharmacy Pharmacy Name Phone [facility] [address] [address] [phone] Your Updated Medication List  
  
   
This list is accurate as of: 8/11/17  2:28 PM.  Always use your most recent med list.  
  
  
  
  
 BENADRYL 25 mg capsule Generic drug:  diphenhydrAMINE Take 50 mg by mouth as needed. clindamycin 150 mg capsule Commonly known as:  CLEOCIN Take 2 Caps by mouth four (4) times daily for 7 days. glucose blood VI test strips strip Commonly known as:  Ayesha Lavonne Use twice daily. HYDROcodone-acetaminophen 7.5-325 mg per tablet Commonly known as:  De[place]itt Plum Take 1 Tab by mouth every six (6) hours as needed for Pain. Max Daily Amount: 4 Tabs. Do not drive for 6 hours after taking, may impair ability to drive  
  
 ibuprofen 100 mg tablet Take 200 mg by mouth as needed for Pain. Lancets Misc Commonly known as: One Touch Darol Guitar Use twice daily. metFORMIN 500 mg tablet Commonly known as:  GLUCOPHAGE Take 1 Tab by mouth two (2) times daily (with meals). ondansetron 8 mg disintegrating tablet Commonly known as:  ZOFRAN ODT Take 1 Tab by mouth now for 1 dose. We Performed the Following AMB POC HEMOGLOBIN A1C [36934 CPT(R)] COLLECTION CAPILLARY BLOOD SPECIMEN [95959 CPT(R)] Introducing [facility]! Angie Palm introduces [employer] patient portal. Now you can access parts of your medical record, email your doctor's office, and request medication refills online. 1. In your internet browser, go to [email or web address] 2. Click on the First Time User? Click Here link in the Sign In box. You will see the New Member Sign Up page. 3. Enter your [employer] Access Code exactly as it appears below. You will not need to use this code after youve completed the sign-up process. If you do not sign up before the expiration date, you must request a new code. · [employer] Access Code: [email or web address] Expires: 11/5/2017  8:07 AM 
 
4. Enter the last four digits of your Social Security Number (xxxx) and Date of Birth (mm/dd/yyyy) as indicated and click Submit. You will be taken to the next sign-up page. 5. Create a S[employer] ID. This will be your [employer] login ID and cannot be changed, so think of one that is secure and easy to remember. 6. Create a [employer] password. You can change your password at any time. 7. Enter your Password Reset Question and Answer. This can be used at a later time if you forget your password. 8. Enter your e-mail address. You will receive e-mail notification when new information is available in [address] 9. Click Sign Up. You can now view and download portions of your medical record. 10. Click the Download Summary menu link to download a portable copy of your medical information. If you have questions, please visit the Frequently Asked Questions section of the [employer] website. Remember, [employer] is NOT to be used for urgent needs. For medical emergencies, dial 911. Now available from your iPhone and Android! Please provide this summary of care documentation to your next provider. Your primary care clinician is listed as Sailaja Blackmon. If you have any questions after today's visit, please call [phone].

## 2017-08-11 NOTE — ED PROVIDER NOTES
HPI Comments: Serenity Chi is a 33 y.o. male who presents ambulatory with his wife to the ED with a c/o left sided facial swelling x 1 week, worse today. Pt notes he was seen in the ER 8/7/17, had a ct showing moderate cellulitis/ dental infection, and was sent to his dentist. He was tx with hydrocodone and clindamycin. Per wife, pt had xrays showing no definitive abscess to drain and pt was referred to oral surgery. Per pt's wife, pt was sent by the oral surgeon for \"high dose IV antibiotics because they oral antibiotics were not working fast enough\". Per family, pt has been taking his clindamycin po and percocet. Pt has been having chills, but no fever, nausea today and difficulty eating secondary to pain. Per wife, pt did not take his glucophage 2 days after his ct, then for the next few days because he could not eat. Pt has been trying to take in po fluids and had a milk shake today. He did take his metformin today. He specifically denies any fevers, vomiting, chest pain, abd pain, urinary sx, shortness of breath, headache, rash, diarrhea, sweating or weight loss. Pt's wife gives a business card with oral surgeons name and phone number requesting oral surgeon be notified of pt's arrival and requests that the oral surgeon convey his orders to the ER. PCP: Cheng Flor MD  PMHx significant for: Past Medical History:  No date: Diabetes ([facility] [address]  11/7/2013: HTN (hypertension)  PSHx significant for: History reviewed. No pertinent surgical history. Social Hx: Tobacco: denies  EtOH: denies  Illicit drug use: denies    There are no further complaints or symptoms at this time. The history is provided by the patient and the spouse. Past Medical History:   Diagnosis Date    Diabetes ([facility] [address]     HTN (hypertension) 11/7/2013       History reviewed. No pertinent surgical history.       Family History:   Problem Relation Age of Onset    Hypertension Mother     Psychiatric Disorder Mother     Thyroid Disease Mother     Thyroid Disease Father        Social History     Social History    Marital status:      Spouse name: N/A    Number of children: N/A    Years of education: N/A     Occupational History    Not on file. Social History Main Topics    Smoking status: Never Smoker    Smokeless tobacco: Never Used    Alcohol use No    Drug use: No    Sexual activity: Not on file     Other Topics Concern    Not on file     Social History Narrative         ALLERGIES: Review of patient's allergies indicates no known allergies. Review of Systems   Constitutional: Positive for appetite change and chills. Negative for fever. HENT: Positive for dental problem and facial swelling. Negative for congestion, rhinorrhea, sneezing and sore throat. Eyes: Negative for redness and visual disturbance. Respiratory: Negative for shortness of breath. Cardiovascular: Negative for chest pain and leg swelling. Gastrointestinal: Positive for nausea. Negative for abdominal pain, constipation, diarrhea and vomiting. Genitourinary: Negative for difficulty urinating and frequency. Musculoskeletal: Negative for back pain, myalgias and neck stiffness. Skin: Negative for rash. Neurological: Negative for dizziness, syncope, weakness and headaches. Hematological: Negative for adenopathy.        Patient Vitals for the past 12 hrs:   Temp Pulse Resp BP SpO2   08/11/17 1845 - - 16 127/75 94 %   08/11/17 1830 - - 20 140/81 95 %   08/11/17 1815 - - 18 160/82 -   08/11/17 1745 - - 16 125/64 96 %   08/11/17 1730 - - 16 129/76 94 %   08/11/17 1715 - - - 138/75 95 %   08/11/17 1704 - 98 - - 94 %   08/11/17 1700 - - - 129/82 92 %   08/11/17 1658 - 95 - - 94 %   08/11/17 1645 - - - 143/86 95 %   08/11/17 1630 - - - 134/85 95 %   08/11/17 1623 - (!) 106 - - 94 %   08/11/17 1619 - - - 142/88 -   08/11/17 1551 97.9 °F (36.6 °C) (!) 118 18 (!) 174/96 96 %              Physical Exam   Constitutional: He is oriented to person, place, and time. He appears well-developed and well-nourished. No distress. HENT:   Head: Normocephalic and atraumatic. Right Ear: External ear normal.   Left Ear: External ear normal.   Nose: Nose normal.   Mouth/Throat: Oropharynx is clear and moist. No oropharyngeal exudate. Left lower jaw with moderate swelling. No discoloration or lesions. + limited jaw opening secondary to pain, + trismus,  Able to speak in complete sentences. Handling secretions. Eyes: EOM are normal. Pupils are equal, round, and reactive to light. Neck: Neck supple. No JVD present. No tracheal deviation present. Cardiovascular: Normal rate, regular rhythm, normal heart sounds and intact distal pulses. Exam reveals no gallop and no friction rub. No murmur heard. Pulmonary/Chest: Effort normal and breath sounds normal. No stridor. No respiratory distress. He has no wheezes. He has no rales. He exhibits no tenderness. Abdominal: Soft. Bowel sounds are normal. He exhibits no distension and no mass. There is no tenderness. There is no rebound and no guarding. Musculoskeletal: Normal range of motion. He exhibits no edema, tenderness or deformity. Lymphadenopathy:     He has no cervical adenopathy. Neurological: He is alert and oriented to person, place, and time. No cranial nerve deficit. Coordination normal.   Skin: No rash noted. No erythema. No pallor. Psychiatric: He has a normal mood and affect. His behavior is normal.   Nursing note and vitals reviewed.                MDM  Number of Diagnoses or Management Options     Amount and/or Complexity of Data Reviewed  Clinical lab tests: reviewed and ordered  Tests in the radiology section of CPT®: reviewed  Tests in the medicine section of CPT®: reviewed and ordered  Obtain history from someone other than the patient: yes (wife)  Review and summarize past medical records: yes  Independent visualization of images, tracings, or specimens: yes    Patient Progress  Patient progress: stable    ED Course       Procedures  4:00 PM  Discussed pt, sx, hx and current findings with Dr Eliane Alvarado. She is in agreement with plan and will see pt  Karen Villalobos. ANDRIY Toribio    4:20 PM  Left message on  for Dr Carlos Rouse, [address] [facility] [phone] for return call. Karen Villalobos. ANDRIY Toribio      5:09 PM  Karen Villalobos. ANDRIY Toribio spoke with Dr. Laverne Rowe, Consult for ENT. Discussed available diagnostic tests and clinical findings. He is in agreement with care plans as outlined. He will look at CT scans/ pictures in chart and call back  KAY Pulido       5:20 PM   Dr Laverne Rowe returned call. Recommends repeating the scan. If significant worsening, admit pt to medicine. If improving/ stable. Add ominicef to abx and have pt follow up at the beginning of the week. Karen Villalobos. ANDRIY Toribio    5:22 PM   Pt and family updated on current plan  Karen Villalobos. ANDRIY Toribio      6:28 PM  Dr Carlos Rouse, DDS returned call, spoke with Karen Villalobos. ANDRIY Toribio, noting that he is on vacation in [place]. He states \"this isn't even my patient. I only saw him once for 15 min consultation at the request of his dentist.\"  He further notes that he increased pt's clindamycin from tid to qid the other day and encouraged pt to follow up with his pcp for admission for iv abx if the swelling worsened. \" [facility] does not have oral surgery coverage, so I would not send him there\". Dr Danielle Post notes he suspects the source of the patient's swelling a denal infection, \"but I'm not even sure as there was so much swelling I really could not see. \" Dr. Danielle Post further recommended an ENT consult. Advised that pt was getting a repeat ct and that pt's care would be managed    6:58 PM  Karen Villalobos. ANDRIY Toribio  Spoke with Dr Laverne Rowe again, reviewed results. Dr Laverne Rowe recommended adding unasyn, admitting pt to [facility] and he would drain the abscess tomorrow  Karen Villalobos. ANDRIY Toribio    7:16 PM  Karen Villalobos.  ANDRIY Toribio spoke with Dr. Robbie Tirado, Consult for Hospitalist. Discussed available diagnostic tests and clinical findings. He is in agreement with care plans as outlined. He will accept pt in transfer  KAY Ruff    LABS COMPLETED AND REVIEWED:  Recent Results (from the past 12 hour(s))   AMB POC HEMOGLOBIN A1C    Collection Time: 08/11/17  2:12 PM   Result Value Ref Range    Hemoglobin A1c (POC) 6.6 %   GLUCOSE, POC    Collection Time: 08/11/17  4:13 PM   Result Value Ref Range    Glucose (POC) 240 (H) 65 - 100 mg/dL    Performed by Dorothy Holder    CBC WITH AUTOMATED DIFF    Collection Time: 08/11/17  4:17 PM   Result Value Ref Range    WBC 6.6 4.1 - 11.1 K/uL    RBC 5.08 4.10 - 5.70 M/uL    HGB 16.0 12.1 - 17.0 g/dL    HCT 45.6 36.6 - 50.3 %    MCV 89.8 80.0 - 99.0 FL    MCH 31.5 26.0 - 34.0 PG    MCHC 35.1 30.0 - 36.5 g/dL    RDW 12.3 11.5 - 14.5 %    PLATELET 280 641 - 920 K/uL    NEUTROPHILS 77 (H) 32 - 75 %    LYMPHOCYTES 12 12 - 49 %    MONOCYTES 9 5 - 13 %    EOSINOPHILS 1 0 - 7 %    BASOPHILS 1 0 - 1 %    ABS. NEUTROPHILS 5.1 1.8 - 8.0 K/UL    ABS. LYMPHOCYTES 0.8 K/UL    ABS. MONOCYTES 0.6 0.0 - 1.0 K/UL    ABS. EOSINOPHILS 0.1 0.0 - 0.4 K/UL    ABS.  BASOPHILS 0.0 0.0 - 0.1 K/UL    DF AUTOMATED     METABOLIC PANEL, BASIC    Collection Time: 08/11/17  4:17 PM   Result Value Ref Range    Sodium 135 (L) 136 - 145 mmol/L    Potassium 4.3 3.5 - 5.1 mmol/L    Chloride 98 97 - 108 mmol/L    CO2 30 21 - 32 mmol/L    Anion gap 7 5 - 15 mmol/L    Glucose 243 (H) 65 - 100 mg/dL    BUN 18 6 - 20 MG/DL    Creatinine 1.14 0.70 - 1.30 MG/DL    BUN/Creatinine ratio 16 12 - 20      GFR est AA >60 >60 ml/min/1.73m2    GFR est non-AA >60 >60 ml/min/1.73m2    Calcium 10.1 8.5 - 10.1 MG/DL   GLUCOSE, POC    Collection Time: 08/11/17  8:31 PM   Result Value Ref Range    Glucose (POC) 144 (H) 65 - 100 mg/dL    Performed by Marisol Doran        IMAGING COMPLETED AND REVIEWED:  The following have been ordered and reviewed:    Ct Neck Soft Tissue W Cont    Result Date: 8/11/2017  EXAM:  CT NECK SOFT TISSUE W CONT INDICATION:  continued swelling to left jaw COMPARISON: 8/7/2017. CONTRAST: 100 mL of Isovue-300. TECHNIQUE: Multislice helical CT was performed from the mid calvarium to the aortic arch during uneventful rapid bolus intravenous contrast administration. Contiguous 2.5 mm axial images were reconstructed and lung and soft tissue windows were generated. Coronal and sagittal reformations were generated. CT dose reduction was achieved through use of a standardized protocol tailored for this examination and automatic exposure control for dose modulation. FINDINGS: Since the previous examination there has been increase in the swelling of the left masseter muscle significantly. There is also a focal fluid collection measuring 9 x 17 x 20 mm which lies in the deep portion of the inferior masseter muscle on the left along the cortex of the posterior mid body of the mandible. The mandible is unremarkable. There is no evidence of abnormality of the adjacent teeth. There is generalized edema noted along the floor of the mouth on the left along the medial cortex of the mandible, however there is no collection in the floor of the mouth. The left submandibular gland is displaced slightly inferior by the amount of edema. Fat plane between the cephalad margin of the submandibular gland and the adjacent edema is blurred. There is prominence of the level 2 adjacent lymph nodes as well as level 3 adjacent lymph nodes which is somewhat more prominent than on the previous examination. There is no low density within the lymph nodes to suggest suppurative adenitis, however. The carotid and jugular vessels enhance normally. Thyroid gland and parotid glands as well as submandibular gland are normal.. No nasopharyngeal, pharyngeal or laryngeal mass is identified. No abnormalities are identified in the visualized portions of the brain or orbits.  The visualized mastoid air cells and paranasal sinuses are clear. The visualized portions of the lung apices are clear. IMPRESSION: There is increased swelling of the masseter muscle and surrounding fat planes on the left without evidence of bony involvement of the mandible. There is also a focal fluid collection along the external or focal cortex of the body of the mandible. These are new findings. Prominence of the level 2 and 3 lymph nodes on the left has increased. MEDICATIONS GIVEN:  Medications   morphine injection 6 mg (not administered)   ondansetron (ZOFRAN) injection 4 mg (not administered)   clindamycin (CLEOCIN) 900mg D5W 50mL IVPB (premix) (0 mg IntraVENous IV Completed 8/11/17 1700)   sodium chloride 0.9 % bolus infusion 1,000 mL (0 mL IntraVENous IV Completed 8/11/17 1714)   ketorolac (TORADOL) injection 15 mg (15 mg IntraVENous Given 8/11/17 1636)   morphine injection 6 mg (6 mg IntraVENous Given 8/11/17 1637)   ondansetron (ZOFRAN) injection 4 mg (4 mg IntraVENous Given 8/11/17 1636)   sodium chloride 0.9 % bolus infusion 1,000 mL (0 mL IntraVENous IV Completed 8/11/17 1932)   iopamidol (ISOVUE 300) 61 % contrast injection 100 mL (100 mL IntraVENous Given 8/11/17 1813)   ampicillin-sulbactam (UNASYN) 3 g in 0.9% sodium chloride (MBP/ADV) 100 mL (0 g IntraVENous IV Completed 8/11/17 2036)         CLINICAL IMPRESSION:  1. Facial abscess    2. Facial swelling    3. Hyperglycemia          Plan  1. Admission per Hospitalist      7:29 PM   Discussed impending transfer with pt/ family. Pt/family were instructed that [place] [address] does not have inpatient services and that pt would be transferred to [facility]. Pt/ Family understands and agrees with care plan. Jeff Toribio PA-C

## 2017-08-12 PROBLEM — E11.9 CONTROLLED TYPE 2 DIABETES MELLITUS WITHOUT COMPLICATION, WITHOUT LONG-TERM CURRENT USE OF INSULIN (HCC): Status: ACTIVE | Noted: 2017-08-12

## 2017-08-12 PROBLEM — R22.0 LEFT FACIAL SWELLING: Status: ACTIVE | Noted: 2017-08-12

## 2017-08-12 PROBLEM — R51.9 LEFT-SIDED FACE PAIN: Status: ACTIVE | Noted: 2017-08-12

## 2017-08-12 PROBLEM — L02.01 FACIAL ABSCESS: Status: ACTIVE | Noted: 2017-08-12

## 2017-08-12 LAB
ANION GAP BLD CALC-SCNC: 6 MMOL/L (ref 5–15)
BASOPHILS # BLD AUTO: 0 K/UL (ref 0–0.1)
BASOPHILS # BLD: 0 % (ref 0–1)
BUN SERPL-MCNC: 12 MG/DL (ref 6–20)
BUN/CREAT SERPL: 16 (ref 12–20)
CALCIUM SERPL-MCNC: 9 MG/DL (ref 8.5–10.1)
CHLORIDE SERPL-SCNC: 100 MMOL/L (ref 97–108)
CO2 SERPL-SCNC: 31 MMOL/L (ref 21–32)
CREAT SERPL-MCNC: 0.77 MG/DL (ref 0.7–1.3)
EOSINOPHIL # BLD: 0.1 K/UL (ref 0–0.4)
EOSINOPHIL NFR BLD: 2 % (ref 0–7)
ERYTHROCYTE [DISTWIDTH] IN BLOOD BY AUTOMATED COUNT: 12.5 % (ref 11.5–14.5)
EST. AVERAGE GLUCOSE BLD GHB EST-MCNC: 154 MG/DL
GLUCOSE BLD STRIP.AUTO-MCNC: 132 MG/DL (ref 65–100)
GLUCOSE BLD STRIP.AUTO-MCNC: 151 MG/DL (ref 65–100)
GLUCOSE BLD STRIP.AUTO-MCNC: 157 MG/DL (ref 65–100)
GLUCOSE BLD STRIP.AUTO-MCNC: 171 MG/DL (ref 65–100)
GLUCOSE SERPL-MCNC: 131 MG/DL (ref 65–100)
HBA1C MFR BLD: 7 % (ref 4.2–6.3)
HCT VFR BLD AUTO: 37.3 % (ref 36.6–50.3)
HGB BLD-MCNC: 13 G/DL (ref 12.1–17)
LYMPHOCYTES # BLD AUTO: 28 % (ref 12–49)
LYMPHOCYTES # BLD: 1.4 K/UL (ref 0.8–3.5)
MCH RBC QN AUTO: 31.2 PG (ref 26–34)
MCHC RBC AUTO-ENTMCNC: 34.9 G/DL (ref 30–36.5)
MCV RBC AUTO: 89.4 FL (ref 80–99)
MONOCYTES # BLD: 0.6 K/UL (ref 0–1)
MONOCYTES NFR BLD AUTO: 13 % (ref 5–13)
NEUTS SEG # BLD: 2.8 K/UL (ref 1.8–8)
NEUTS SEG NFR BLD AUTO: 57 % (ref 32–75)
PLATELET # BLD AUTO: 285 K/UL (ref 150–400)
POTASSIUM SERPL-SCNC: 3.7 MMOL/L (ref 3.5–5.1)
RBC # BLD AUTO: 4.17 M/UL (ref 4.1–5.7)
SERVICE CMNT-IMP: ABNORMAL
SODIUM SERPL-SCNC: 137 MMOL/L (ref 136–145)
WBC # BLD AUTO: 5 K/UL (ref 4.1–11.1)

## 2017-08-12 PROCEDURE — 36415 COLL VENOUS BLD VENIPUNCTURE: CPT | Performed by: FAMILY MEDICINE

## 2017-08-12 PROCEDURE — 83036 HEMOGLOBIN GLYCOSYLATED A1C: CPT | Performed by: FAMILY MEDICINE

## 2017-08-12 PROCEDURE — 74011000258 HC RX REV CODE- 258: Performed by: FAMILY MEDICINE

## 2017-08-12 PROCEDURE — 87185 SC STD ENZYME DETCJ PER NZM: CPT | Performed by: SPECIALIST

## 2017-08-12 PROCEDURE — 85025 COMPLETE CBC W/AUTO DIFF WBC: CPT | Performed by: FAMILY MEDICINE

## 2017-08-12 PROCEDURE — 74011250636 HC RX REV CODE- 250/636: Performed by: HOSPITALIST

## 2017-08-12 PROCEDURE — 82962 GLUCOSE BLOOD TEST: CPT

## 2017-08-12 PROCEDURE — 65270000029 HC RM PRIVATE

## 2017-08-12 PROCEDURE — 80048 BASIC METABOLIC PNL TOTAL CA: CPT | Performed by: FAMILY MEDICINE

## 2017-08-12 PROCEDURE — 0W933ZZ DRAINAGE OF ORAL CAVITY AND THROAT, PERCUTANEOUS APPROACH: ICD-10-PCS | Performed by: SPECIALIST

## 2017-08-12 PROCEDURE — 74011250636 HC RX REV CODE- 250/636: Performed by: FAMILY MEDICINE

## 2017-08-12 PROCEDURE — 74011000250 HC RX REV CODE- 250: Performed by: SPECIALIST

## 2017-08-12 PROCEDURE — 74011636637 HC RX REV CODE- 636/637: Performed by: FAMILY MEDICINE

## 2017-08-12 PROCEDURE — 87205 SMEAR GRAM STAIN: CPT | Performed by: SPECIALIST

## 2017-08-12 RX ORDER — SODIUM CHLORIDE 9 MG/ML
125 INJECTION, SOLUTION INTRAVENOUS CONTINUOUS
Status: DISCONTINUED | OUTPATIENT
Start: 2017-08-12 | End: 2017-08-15

## 2017-08-12 RX ORDER — ONDANSETRON 2 MG/ML
4 INJECTION INTRAMUSCULAR; INTRAVENOUS
Status: DISCONTINUED | OUTPATIENT
Start: 2017-08-12 | End: 2017-08-15 | Stop reason: HOSPADM

## 2017-08-12 RX ORDER — MORPHINE SULFATE 2 MG/ML
2 INJECTION, SOLUTION INTRAMUSCULAR; INTRAVENOUS
Status: DISCONTINUED | OUTPATIENT
Start: 2017-08-12 | End: 2017-08-14 | Stop reason: SDUPTHER

## 2017-08-12 RX ORDER — BUPIVACAINE HYDROCHLORIDE 2.5 MG/ML
10 INJECTION, SOLUTION EPIDURAL; INFILTRATION; INTRACAUDAL ONCE
Status: COMPLETED | OUTPATIENT
Start: 2017-08-12 | End: 2017-08-12

## 2017-08-12 RX ORDER — AMOXICILLIN 250 MG
2 CAPSULE ORAL DAILY
Status: DISCONTINUED | OUTPATIENT
Start: 2017-08-12 | End: 2017-08-15 | Stop reason: HOSPADM

## 2017-08-12 RX ORDER — DEXTROSE 50 % IN WATER (D50W) INTRAVENOUS SYRINGE
12.5-25 AS NEEDED
Status: DISCONTINUED | OUTPATIENT
Start: 2017-08-12 | End: 2017-08-12 | Stop reason: CLARIF

## 2017-08-12 RX ORDER — SODIUM CHLORIDE 0.9 % (FLUSH) 0.9 %
5-10 SYRINGE (ML) INJECTION AS NEEDED
Status: DISCONTINUED | OUTPATIENT
Start: 2017-08-12 | End: 2017-08-15 | Stop reason: HOSPADM

## 2017-08-12 RX ORDER — HYDROMORPHONE HYDROCHLORIDE 1 MG/ML
1 INJECTION, SOLUTION INTRAMUSCULAR; INTRAVENOUS; SUBCUTANEOUS
Status: DISCONTINUED | OUTPATIENT
Start: 2017-08-12 | End: 2017-08-12

## 2017-08-12 RX ORDER — LIDOCAINE HYDROCHLORIDE AND EPINEPHRINE 10; 10 MG/ML; UG/ML
1.5 INJECTION, SOLUTION INFILTRATION; PERINEURAL ONCE
Status: COMPLETED | OUTPATIENT
Start: 2017-08-12 | End: 2017-08-12

## 2017-08-12 RX ORDER — INSULIN LISPRO 100 [IU]/ML
INJECTION, SOLUTION INTRAVENOUS; SUBCUTANEOUS EVERY 6 HOURS
Status: DISCONTINUED | OUTPATIENT
Start: 2017-08-12 | End: 2017-08-15 | Stop reason: HOSPADM

## 2017-08-12 RX ORDER — SODIUM CHLORIDE 0.9 % (FLUSH) 0.9 %
5-10 SYRINGE (ML) INJECTION EVERY 8 HOURS
Status: DISCONTINUED | OUTPATIENT
Start: 2017-08-12 | End: 2017-08-15 | Stop reason: HOSPADM

## 2017-08-12 RX ORDER — CLINDAMYCIN PHOSPHATE 900 MG/50ML
900 INJECTION INTRAVENOUS EVERY 6 HOURS
Status: DISCONTINUED | OUTPATIENT
Start: 2017-08-12 | End: 2017-08-14 | Stop reason: DRUGHIGH

## 2017-08-12 RX ORDER — MAGNESIUM SULFATE 100 %
4 CRYSTALS MISCELLANEOUS AS NEEDED
Status: DISCONTINUED | OUTPATIENT
Start: 2017-08-12 | End: 2017-08-15 | Stop reason: HOSPADM

## 2017-08-12 RX ADMIN — INSULIN LISPRO 2 UNITS: 100 INJECTION, SOLUTION INTRAVENOUS; SUBCUTANEOUS at 05:55

## 2017-08-12 RX ADMIN — AMPICILLIN SODIUM AND SULBACTAM SODIUM 3 G: 2; 1 INJECTION, POWDER, FOR SOLUTION INTRAMUSCULAR; INTRAVENOUS at 09:30

## 2017-08-12 RX ADMIN — CLINDAMYCIN PHOSPHATE 900 MG: 18 INJECTION, SOLUTION INTRAMUSCULAR; INTRAVENOUS at 14:33

## 2017-08-12 RX ADMIN — Medication 10 ML: at 14:34

## 2017-08-12 RX ADMIN — AMPICILLIN SODIUM AND SULBACTAM SODIUM 3 G: 2; 1 INJECTION, POWDER, FOR SOLUTION INTRAMUSCULAR; INTRAVENOUS at 19:05

## 2017-08-12 RX ADMIN — CLINDAMYCIN PHOSPHATE 900 MG: 18 INJECTION, SOLUTION INTRAMUSCULAR; INTRAVENOUS at 19:11

## 2017-08-12 RX ADMIN — HYDROMORPHONE HYDROCHLORIDE 1 MG: 1 INJECTION, SOLUTION INTRAMUSCULAR; INTRAVENOUS; SUBCUTANEOUS at 05:56

## 2017-08-12 RX ADMIN — MORPHINE SULFATE 2 MG: 2 INJECTION, SOLUTION INTRAMUSCULAR; INTRAVENOUS at 12:24

## 2017-08-12 RX ADMIN — Medication 10 ML: at 05:56

## 2017-08-12 RX ADMIN — MORPHINE SULFATE 2 MG: 2 INJECTION, SOLUTION INTRAMUSCULAR; INTRAVENOUS at 21:38

## 2017-08-12 RX ADMIN — SODIUM CHLORIDE 125 ML/HR: 900 INJECTION, SOLUTION INTRAVENOUS at 12:32

## 2017-08-12 RX ADMIN — INSULIN LISPRO 2 UNITS: 100 INJECTION, SOLUTION INTRAVENOUS; SUBCUTANEOUS at 12:22

## 2017-08-12 RX ADMIN — CLINDAMYCIN PHOSPHATE 900 MG: 18 INJECTION, SOLUTION INTRAMUSCULAR; INTRAVENOUS at 07:11

## 2017-08-12 RX ADMIN — Medication 10 ML: at 01:00

## 2017-08-12 RX ADMIN — AMPICILLIN SODIUM AND SULBACTAM SODIUM 3 G: 2; 1 INJECTION, POWDER, FOR SOLUTION INTRAMUSCULAR; INTRAVENOUS at 02:42

## 2017-08-12 RX ADMIN — BUPIVACAINE HYDROCHLORIDE 25 MG: 2.5 INJECTION, SOLUTION EPIDURAL; INFILTRATION; INTRACAUDAL at 11:00

## 2017-08-12 RX ADMIN — CLINDAMYCIN PHOSPHATE 900 MG: 18 INJECTION, SOLUTION INTRAMUSCULAR; INTRAVENOUS at 02:00

## 2017-08-12 RX ADMIN — SODIUM CHLORIDE 125 ML/HR: 900 INJECTION, SOLUTION INTRAVENOUS at 23:27

## 2017-08-12 RX ADMIN — HYDROMORPHONE HYDROCHLORIDE 1 MG: 1 INJECTION, SOLUTION INTRAMUSCULAR; INTRAVENOUS; SUBCUTANEOUS at 01:03

## 2017-08-12 RX ADMIN — Medication 10 ML: at 21:39

## 2017-08-12 RX ADMIN — LIDOCAINE HYDROCHLORIDE,EPINEPHRINE BITARTRATE 15 MG: 10; .01 INJECTION, SOLUTION INFILTRATION; PERINEURAL at 11:00

## 2017-08-12 RX ADMIN — SODIUM CHLORIDE 125 ML/HR: 900 INJECTION, SOLUTION INTRAVENOUS at 01:00

## 2017-08-12 NOTE — ED NOTES
Pt resting on the stretcher in no distress and talking with his spouse at bedside. Pt updated on additional pain mgmt plan of care and verbalized understanding. V/S reassessed and pt medicated per orders. Side-rails up x2 and call bell within reach; will continue to monitor.

## 2017-08-12 NOTE — PROGRESS NOTES
.Primary Nurse Adina Main and [place] BLOOM performed a dual skin assessment on this patient No impairment noted  Pb score is 23    Patient is self care and up ad wilman.

## 2017-08-12 NOTE — PROGRESS NOTES
Patient: Nilda Degroot MRN: [id number]  SSN: [id number]    YOB: 1985  Age: 33 y.o. Sex: male        Subjective:     Chief Complaint   Patient presents with    Facial Swelling    Dental Pain       HPI: he is a 33y.o. year old male who presents with wife for continued facial swelling and pain. He has seen dentist and maxillary surgeon. Surgeon increased dosing of clindamycin and advised of need for IV antibiotic as there has been no improvement after 5 days of oral  Cindamycin. He has presented here with belief that I could admit/arrange IV antibiotic treatment from this office. Patient complains of sweats and nausea. Patient denies dizziness, SOB, CP, abdominal pain, dysuria, acute myalgias or arthralgias. Encounter Diagnoses   Name Primary?  Abscess or cellulitis, oral soft tissue Yes    Left-sided face pain     Nausea     Tachycardia     Controlled type 2 diabetes mellitus without complication, without long-term current use of insulin ([address]          Patient Active Problem List    Diagnosis Date Noted    Facial abscess 08/12/2017    Left-sided face pain 08/12/2017    Left facial swelling 08/12/2017    Controlled type 2 diabetes mellitus without complication, without long-term current use of insulin ([address] 08/12/2017    Ketonuria 10/06/2016    Hyperglycemia 10/05/2016    Rib pain 02/24/2014    Erectile dysfunction 02/24/2014    HTN (hypertension) 11/07/2013    ADD (attention deficit disorder) 11/07/2013       Past Medical History:   Diagnosis Date    Diabetes ([address]     HTN (hypertension) 11/7/2013       No Known Allergies    History reviewed. No pertinent surgical history.     Social History     Social History    Marital status:      Spouse name: N/A    Number of children: N/A    Years of education: N/A     Social History Main Topics    Smoking status: Never Smoker    Smokeless tobacco: Never Used    Alcohol use No    Drug use: No    Sexual activity: Not Asked Other Topics Concern    None     Social History Narrative         Objective:     Review of Systems:  Constitutional: Positive for fatigue or malaise  Derm: Positive left facial erythema  HEENT: positive for left sided facial swelling and pain  Cardiovascular: Negative for dizziness, chest pain or palpitations  Respiratory: Negative for cough, wheezing or SOB  Gastreintestinal: Positive for  Nausea, Negative for abdominal pain  Genital/urinary: Negative for dysuria or voiding dysfunction  Muscoloskeletal: Positive for left sided facial/neck myalgias   Neurological: Negative for headache, weakness or paresthesia      Vitals:    08/11/17 1350   BP: (!) 145/95   Pulse: (!) 120   Resp: 20   Temp: 97.2 °F (36.2 °C)   TempSrc: Oral   SpO2: 98%   Weight: 257 lb 9.6 oz (116.8 kg)   Height: 6' (1.829 m)      Body mass index is 34.94 kg/(m^2). Physical Exam:  Constitutional: appears in acute distress  Skin: diaphoretic  Head: left facial edema  Eyes: sclera clear, EOMI  Neck: range of motion limited by facial pain  Cardiovascular: normal S1, S2, tachycardic, regular rhythm  Respiratory: clear to auscultation bilaterally with symmetrical effort  Abdomen: soft, BS normal  Extremities: full range of motion  Neurology: normal strength and sensation  Psych: active, alert and oriented, affect appropriate         Assessment and orders:       ICD-10-CM ICD-9-CM    1. Abscess or cellulitis, oral soft tissue K12.2 528.3    2. Left-sided face pain R51 784.0    3. Nausea R11.0 787.02    4. Tachycardia R00.0 785.0    5. Controlled type 2 diabetes mellitus without complication, without long-term current use of insulin (HCC) E11.9 250.00 AMB POC HEMOGLOBIN A1C      COLLECTION CAPILLARY BLOOD SPECIMEN         Plan of care:  Diagnoses were discussed in detail with patient and wife. Sofie's wife advised to take him immediately to the ED. Patient and wife expressed understanding and agreed to plan.   Medication risks/benefits/side effects discussed with patient. All of the patient's questions were addressed and answered to apparent satisfaction. The patient knows to call back if they have questions about the plan of care or if symptoms change. The patient received an After-Visit Summary which contains VS, diagnoses, orders, allergy and medication lists. Patient Care Team:  Nadiya Varela MD as PCP - General (Family Practice)  Sharron Villatoro RN as Ambulatory Care Navigator    Follow-up Disposition:  Return in about 2 weeks (around 8/25/2017), or if symptoms worsen or fail to improve.     Future Appointments  Date Time Provider Karla Tipton   8/15/2017 2:20 PM Nadiya Varela MD [facility]       Signed By: Nadiya Varela MD     August 12, 2017

## 2017-08-12 NOTE — ED NOTES
Pt resting on the stretcher texting on his phone and rpts moderate relief of pain since last medicated. Pt/Spouse updated on bed assignment at [facility] and delay in giving rpt; both verbalized understanding. V/S reassessed. Lights dim for comfort. Call bell within reach; will continue to monitor.

## 2017-08-12 NOTE — ED NOTES
Attempted to call report. Unable to receive at this time. Spoke with transfer center. Per her, there are only 2 nurses on the floor and they cannot take on anymore patients. Per transfer center, a nursing supervisor will be contacted. ALIE FERNANDEZ 20 min.

## 2017-08-12 NOTE — PROCEDURES
Left abscessed drained  3cc lidocaine with marcain 0.25% injected left oral cavity  18 guauge needle passed  2 cc pus extruded and sent for culture

## 2017-08-12 NOTE — H&P
[address] [facility] [place] [address]   HISTORY AND PHYSICAL       Name:  Amparo Villegas   MR#:  [id number]   :  1985   Account #:  [de-identified]        Date of Adm:  2017           CHIEF COMPLAINT: Left facial swelling and pain. HISTORY OF PRESENT ILLNESS: A 60-year-old white male with past   medical history of type 2 diabetes mellitus, hypertension, obesity,   presented as a direct admission/transfer from [facility] ER with   chief complaint of left facial pain and swelling. The patient noted onset   of symptoms starting approximately a week ago which actually   worsened, became progressive, severe abuse without specific   alleviating factors, exacerbated with any touch or movement. He   notably was seen and evaluated in the ER on 2017 for the same. He had a workup which included CT soft tissue neck with IV contrast   on 2017 which showed soft tissue swelling centered at the left   suggesting fracture compatible with cellulitis with no evident underlying   abscess or other bony or soft tissue abnormality. There was thoughts   the patient may have a dental infection. The patient reportedly then   was seen by his dentist, had been prescribed hydrocodone,   clindamycin for oral antibiotic treatment. He reportedly had then been   referred to oral surgeon who recommended high-dose IV antibiotics, as   the oral antibiotics were not felt to be working fast enough. He   unfortunately has not been able to take his medication including   metformin with inability to fully open his mouth. He notes that he has   been taking in some oral fluids and milkshakes secondary to the same. He notes he is not able to chew food because it is too painful. He has   pain that extends from the left side of his face into the left neck. His   wife notes the patient had some chills. The patient notes his pain is   severe, constant, now rated 8/10.  No reports of syncope, loss of consciousness, numbness, paresthesias, slurred speech, facial droop,   focal weakness, headache, chest pain, shortness of breath, cough,   congestion, abdominal pain, nausea, vomiting, diarrhea, melena,   dysuria, hematuria, calf pain, swelling, edema. The patient went to   [facility] ER on 08/11/2017 for evaluation. He had CT soft tissue   neck with contrast repeated which showed focal fluid collection   measuring 9 x 17 x 21 mm lying in the deep portion of the inferior   masseter muscle on the left along cortex of the posterior mid body of   the mandible with no evidence of abnormality of the adjacent teeth with   generalized edema along the floor of the mouth on the left with   prominence of level 2 adjacent lymph nodes as well as level 3 adjacent   lymph nodes which were more prominent than on previous   examination. Per review of ER provider note from [facility],   oral surgeon followed by ENT surgeon were consulted. Requested to   make the patient to be transferred to [facility] for possible   eventual surgical drainage. The patient is now seen for admission to   the hospitalist service. PAST MEDICAL HISTORY:   1. Type 2 diabetes mellitus   2. Hypertension. 3. Obesity. PAST SURGICAL HISTORY: Status post unspecified surgery for head   trauma age of 4. MEDICATIONS:   1. Clindamycin 150 mg 2 capsules p.o. q.i.d.   2. Benadryl 50 mg p.o. p.r.n.   3. Norco 7.5/325 mg 1 tablet p.o. q.6 hours p.r.n.   4. Ibuprofen 200 mg p.o. p.r.n.   5. Metformin 500 mg 1 tablet p.o. b.i.d.   6. Zofran ODT 8 mg disintegrating tablet 1 tablet p.o. p.r.n.   7. Percocet 10/325 mg 1 tablet p.r.n. ALLERGIES: NO KNOWN DRUG ALLERGIES. SOCIAL HISTORY: Negative for smoking and illicit drugs. Positive for   occasional alcohol. . FAMILY HISTORY: Hypertension mother, psychiatric disorder mother. Thyroid disease in mother and father.     REVIEW OF SYSTEMS   Ten systems reviewed, pertinent positives as HPI, otherwise negative. PHYSICAL EXAMINATION   VITAL SIGNS: Temperature is 99.0 degrees Fahrenheit, blood   pressure 155/102, heart rate 84, respiratory rate 16, O2 saturation   98% on room air, recorded weight 259 pounds (117.8 kg), recorded   height of 61 inches tall. GENERAL: Obese male in no acute respiratory distress. PSYCHIATRIC: The patient is awake, alert, oriented x3. NEUROLOGIC: GCS 15. Moves extremities x4. Sensation grossly   intact. No slurred speech, no facial droop. HEENT: Normocephalic, atraumatic. PERRLA. EOMs intact. Sclerae   are anicteric. Conjunctivae clear. Nares are patent. Oropharynx difficult   to evaluate secondary to inability of the patient to fully open his mouth. The anterior tongue is non-edematous. There is significant severe left facial swelling, edema   and tenderness on palpation which extends to the left anterior and   posterior cervical neck regions. NECK: As noted, significant swelling, edema, tenderness and vomiting   of left side of face and neck without evidence of tracheal deviation. LYMPH: Unable to fully palpate to left neck secondary to patient   sensitivity and tenderness on exam and marked edema. RESPIRATORY: Lungs are clear to auscultation bilaterally. CARDIOVASCULAR: Heart regular rate and rhythm, normal S1, S2,   no murmurs, rubs or gallops. GI: Abdomen soft, nontender, nondistended. Normoactive bowel   sounds. No rebound, guarding or rigidity. No auscultated abdominal   bruits. No pulsatile mass. BACK: No CVA tenderness. No step-off deformity. MUSCULOSKELETAL: No acute palpable deformity. Negative for calf   tenderness. VASCULAR: 2+ radial, 1+ dorsalis pedis pulses, without cyanosis,   clubbing, edema. SKIN: Warm and dry. LABORATORY DATA: Reviewed. Sodium is 135, potassium 4.3,   chloride 98, CO2 30, BUN of 18, creatinine of 1.14, glucose 243, anion   gap 7, calcium is 10.1, GFR greater than 60.  WBC 6.6, hemoglobin 16.0, hematocrit 45.6, platelets 887, neutrophils 77%. CT soft tissue   neck with contrast, results reviewed and noted in HPI. IMPRESSION AND PLAN:   1. Left facial abscess. Admit patient to medical floor. Consult with ENT   surgeon to evaluate the patient today for possible surgical drainage. In   interim continue patient on antibiotics with recommendations to place   on Unasyn. The patient had been on clindamycin. May adjust   antibiotics accordingly. 2. Hypertension, uncontrolled. Provide pain management first. If not   resolved by afternoon, place on antihypertensive additional   medications. 3. Type 2 diabetes mellitus with hyperglycemia. Place on Humalog   insulin correctional coverage, scheduled Accu-Cheks. Check   hemoglobin A1c level. 4. Left facial edema/swelling. As mentioned provide pain management. 5. Left facial pain. Plan as noted. 6. Cervical lymphadenopathy. Secondary to infection. Continue plan as noted. 7. Venous thromboembolism prophylaxis. Sequential compression   devices bilateral lower extremities. STANTON Luna MD MP / [place]   D:  08/12/2017   01:33   T:  08/12/2017   04:12   Job #:  [id number]

## 2017-08-12 NOTE — CONSULTS
Otolaryngology  History and Physical    Subjective:      Farshad Leo is a 33 y.o. male who presents for management of left oral abscess. This has been present for the past week and has progressed from lymphadenitis to a 2.0 cm oral abscess. Thought to have been of a tooth origin but unclear to the oral surgeon. Oral surgeon sent him to the OR for management. ATSP regarding abscess management     Past Medical History:   Diagnosis Date    Diabetes ([address]     HTN (hypertension) 11/7/2013     No past surgical history on file. Family History   Problem Relation Age of Onset    Hypertension Mother     Psychiatric Disorder Mother     Thyroid Disease Mother     Thyroid Disease Father      Social History   Substance Use Topics    Smoking status: Never Smoker    Smokeless tobacco: Never Used    Alcohol use No      Prior to Admission medications    Medication Sig Start Date End Date Taking? Authorizing Provider   clindamycin (CLEOCIN) 150 mg capsule Take 2 Caps by mouth four (4) times daily for 7 days. 8/11/17 8/18/17 Yes Kamaljit Eng MD   ondansetron (ZOFRAN ODT) 8 mg disintegrating tablet Take 1 Tab by mouth now for 1 dose. 8/11/17 8/11/17 Yes Kamaljit Eng MD   oxyCODONE-acetaminophen (PERCOCET 10)  mg per tablet Take  by mouth. Yes Sabrina Harris MD   ibuprofen 100 mg tablet Take 200 mg by mouth as needed for Pain. Yes Sabrina Harris MD   HYDROcodone-acetaminophen (NORCO) 7.5-325 mg per tablet Take 1 Tab by mouth every six (6) hours as needed for Pain. Max Daily Amount: 4 Tabs. Do not drive for 6 hours after taking, may impair ability to drive 5/0/35  Yes Talisha Solano MD   metFORMIN (GLUCOPHAGE) 500 mg tablet Take 1 Tab by mouth two (2) times daily (with meals). 4/10/17  Yes Kamaljit Eng MD   diphenhydrAMINE (BENADRYL) 25 mg capsule Take 50 mg by mouth as needed. Sabrina Harris MD   glucose blood VI test strips (ONETOUCH VERIO) strip Use twice daily.  10/6/16   Iveth Lara MD   Lancets (ONE TOUCH DELICA) misc Use twice daily. 10/6/16   Louie Carlson MD      No Known Allergies    Review of Systems:  Pertinent items are noted in the History of Present Illness. Objective:      Patient Vitals for the past 8 hrs:   BP Temp Pulse Resp SpO2   17 0823 126/80 99 °F (37.2 °C) 73 18 94 %   17 0333 143/85 98.7 °F (37.1 °C) 77 18 95 %       Temp (24hrs), Av.4 °F (36.9 °C), Min:97.2 °F (36.2 °C), Max:99 °F (37.2 °C)      Physical Exam:  AU clear  Nose clear  Neck left neck edematous and tender to the touch  OC  No stew mass or swell ing left bottom teeth sensitive    Chest clear  Heart RRR   Assessment:     Left oral abscess     Plan:     1. I will attempt drainage today   2. Maintain clinda and unisyn  3.  Pain management       Signed By: Reilly Parra MD     2017

## 2017-08-12 NOTE — PROGRESS NOTES
Bedside and Verbal shift change report given to Kim (oncoming nurse) by Lucila Beverly (offgoing nurse). Report included the following information SBAR, Kardex and Intake/Output.

## 2017-08-12 NOTE — ED NOTES
TRANSFER - OUT REPORT:    Verbal report given to Cha Mckeon RN (name) on Carole Stock  being transferred to Susanna Payne RN (unit) for routine progression of care       Report consisted of patients Situation, Background, Assessment and   Recommendations(SBAR). Information from the following report(s) SBAR, ED Summary, Procedure Summary, MAR and Recent Results was reviewed with the receiving nurse. Lines:   Peripheral IV 08/11/17 Left Antecubital (Active)   Site Assessment Clean, dry, & intact 8/11/2017  4:24 PM   Phlebitis Assessment 0 8/11/2017  4:24 PM   Infiltration Assessment 0 8/11/2017  4:24 PM   Dressing Status Clean, dry, & intact; New 8/11/2017  4:24 PM   Dressing Type Transparent;Tape 8/11/2017  4:24 PM   Hub Color/Line Status Pink;Capped;Flushed;Patent 8/11/2017  4:24 PM   Action Taken Blood drawn 8/11/2017  4:24 PM        Opportunity for questions and clarification was provided.       Patient transported with:   MonitorALIE

## 2017-08-12 NOTE — PROGRESS NOTES
Hospitalist Progress Note  Margot Cazares MD  Office: [phone]  Cell: [phone]      Date of Service:  2017  NAME:  Ayad Lopez  :  1985  MRN:  [id number]      Admission Summary:     32 yo male with PMhx of DM, HTN, Obesity admitted for left facial swelling. Pt was seen in ER  for same and was given abx and discharge. Pt one of his tooth started hurting 1 day prior to swelling. Pt saw Dentist who gave abx and prescribed abx. Pt then saw oral surgeon who asked patient to come to hospital for IV abx. Pt unable to chew or fully open mounth. Interval history / Subjective:     Pt seen and examined  Still with left facial swelling  Pain on opening mouth  Pain is appropriately controlled     Assessment & Plan:     Left Facial Cellulitis with abscess  - on Unasyn and Clinda  - ENT consult appreciated  - s/p Drainage of 2cc pus  - follow cultures  - pain management  - stool softner ordered  - mechanically soft diet    DM type 2  - c/w sliding scale  - A1C 7    Reported Hx of HTN  - not meds  - BP okay, will hold on starting meds    Obesity   Body mass index is 34.28 kg/(m^2). Code status: Full  DVT prophylaxis: scds    Care Plan discussed with: Patient/Family, Nurse and Consultant Cass Francois  Disposition: Home w/Family     Hospital Problems  Date Reviewed: 2017          Codes Class Noted POA    * (Principal)Facial abscess ICD-10-CM: L02.01  ICD-9-CM: 682.0  2017 Unknown        Left-sided face pain ICD-10-CM: R51  ICD-9-CM: 784.0  2017 Unknown        Left facial swelling ICD-10-CM: R22.0  ICD-9-CM: 784.2  2017 Unknown                Review of Systems:   A comprehensive review of systems was negative except for that written in the HPI. Vital Signs:    Last 24hrs VS reviewed since prior progress note.  Most recent are:  Visit Vitals    /80 (BP 1 Location: Right arm)    Pulse 73    Temp 99 °F (37.2 °C)  Resp 18    Ht 6' 1\" (1.854 m)    Wt 117.8 kg (259 lb 12.8 oz)    SpO2 94%    BMI 34.28 kg/m2       No intake or output data in the 24 hours ending 08/12/17 1050     Physical Examination:             Constitutional:  No acute distress, cooperative, pleasant    ENT:  oral mucosa moist, left facial swelling with tenderness and left neck tenderness,    Resp:  CTA bilaterally. CV:  Regular rhythm, normal rate, no murmurs, gallops, rubs    GI:  Soft, non distended, non tender. normoactive bowel sounds    Musculoskeletal:  No edema, warm, 2+ pulses throughout    Neurologic:  Moves all extremities. AAOx3,     Skin:  Good turgor, no rashes or ulcers       Data Review:    Review and/or order of clinical lab test  Review and/or order of tests in the radiology section of CPT  Review and/or order of tests in the medicine section of CPT      Labs:     Recent Labs      08/12/17   0111 08/11/17   1617   WBC  5.0  6.6   HGB  13.0  16.0   HCT  37.3  45.6   PLT  285  289     Recent Labs      08/12/17   0111  08/11/17   1617   NA  137  135*   K  3.7  4.3   CL  100  98   CO2  31  30   BUN  12  18   CREA  0.77  1.14   GLU  131*  243*   CA  9.0  10.1     No results for input(s): SGOT, GPT, ALT, AP, TBIL, TBILI, TP, ALB, GLOB, GGT, AML, LPSE in the last 72 hours. No lab exists for component: AMYP, HLPSE  No results for input(s): INR, PTP, APTT in the last 72 hours. No lab exists for component: INREXT   No results for input(s): FE, TIBC, PSAT, FERR in the last 72 hours. No results found for: FOL, RBCF   No results for input(s): PH, PCO2, PO2 in the last 72 hours. No results for input(s): CPK, CKNDX, TROIQ in the last 72 hours.     No lab exists for component: CPKMB  Lab Results   Component Value Date/Time    Cholesterol, total 188 04/10/2017 10:46 AM    HDL Cholesterol 42 04/10/2017 10:46 AM    LDL, calculated 120 04/10/2017 10:46 AM    Triglyceride 128 04/10/2017 10:46 AM    CHOL/HDL Ratio 8.0 10/05/2016 11:56 PM Lab Results   Component Value Date/Time    Glucose (POC) 157 08/12/2017 05:38 AM    Glucose (POC) 132 08/12/2017 01:04 AM    Glucose (POC) 144 08/11/2017 08:31 PM    Glucose (POC) 240 08/11/2017 04:13 PM    Glucose (POC) 245 10/06/2016 07:35 AM     Lab Results   Component Value Date/Time    Color YELLOW/STRAW 10/06/2016 09:29 AM    Appearance CLEAR 10/06/2016 09:29 AM    Specific gravity >1.030 10/06/2016 09:29 AM    pH (UA) 5.0 10/06/2016 09:29 AM    Protein 30 10/06/2016 09:29 AM    Glucose >1000 10/06/2016 09:29 AM    Ketone 80 10/06/2016 09:29 AM    Bilirubin NEGATIVE  10/06/2016 09:29 AM    Urobilinogen 0.2 10/06/2016 09:29 AM    Nitrites NEGATIVE  10/06/2016 09:29 AM    Leukocyte Esterase NEGATIVE  10/06/2016 09:29 AM    Epithelial cells FEW 10/06/2016 09:29 AM    Bacteria NEGATIVE  10/06/2016 09:29 AM    WBC 0-4 10/06/2016 09:29 AM    RBC 0-5 10/06/2016 09:29 AM         Medications Reviewed:     Current Facility-Administered Medications   Medication Dose Route Frequency    sodium chloride (NS) flush 5-10 mL  5-10 mL IntraVENous Q8H    sodium chloride (NS) flush 5-10 mL  5-10 mL IntraVENous PRN    ondansetron (ZOFRAN) injection 4 mg  4 mg IntraVENous Q6H PRN    0.9% sodium chloride infusion  125 mL/hr IntraVENous CONTINUOUS    glucose chewable tablet 16 g  4 Tab Oral PRN    glucagon (GLUCAGEN) injection 1 mg  1 mg IntraMUSCular PRN    insulin lispro (HUMALOG) injection   SubCUTAneous Q6H    dextrose 10 % infusion 125-250 mL  125-250 mL IntraVENous PRN    ampicillin-sulbactam (UNASYN) 3 g in 0.9% sodium chloride (MBP/ADV) 100 mL  3 g IntraVENous Q8H    clindamycin (CLEOCIN) 900mg D5W 50mL IVPB (premix)  900 mg IntraVENous Q6H    lidocaine-EPINEPHrine (XYLOCAINE) 1 %-1:100,000 injection 15 mg  1.5 mL IntraDERMal ONCE    bupivacaine (PF) (MARCAINE) 0.25 % (2.5 mg/mL) injection 25 mg  10 mL Peripheral Nerve Block ONCE    morphine injection 2 mg  2 mg IntraVENous Q3H PRN    senna-docusate (PERICOLACE) 8.6-50 mg per tablet 2 Tab  2 Tab Oral DAILY     ______________________________________________________________________  EXPECTED LENGTH OF STAY: - - -  ACTUAL LENGTH OF STAY:          1                 Dom Sharma MD

## 2017-08-13 LAB
GLUCOSE BLD STRIP.AUTO-MCNC: 127 MG/DL (ref 65–100)
GLUCOSE BLD STRIP.AUTO-MCNC: 128 MG/DL (ref 65–100)
GLUCOSE BLD STRIP.AUTO-MCNC: 146 MG/DL (ref 65–100)
GLUCOSE BLD STRIP.AUTO-MCNC: 207 MG/DL (ref 65–100)
SERVICE CMNT-IMP: ABNORMAL

## 2017-08-13 PROCEDURE — 74011250637 HC RX REV CODE- 250/637: Performed by: HOSPITALIST

## 2017-08-13 PROCEDURE — 74011000258 HC RX REV CODE- 258: Performed by: FAMILY MEDICINE

## 2017-08-13 PROCEDURE — 82962 GLUCOSE BLOOD TEST: CPT

## 2017-08-13 PROCEDURE — 74011250636 HC RX REV CODE- 250/636: Performed by: HOSPITALIST

## 2017-08-13 PROCEDURE — 65270000029 HC RM PRIVATE

## 2017-08-13 PROCEDURE — 74011250636 HC RX REV CODE- 250/636: Performed by: FAMILY MEDICINE

## 2017-08-13 PROCEDURE — 74011636637 HC RX REV CODE- 636/637: Performed by: FAMILY MEDICINE

## 2017-08-13 RX ADMIN — Medication 1 CAPSULE: at 12:41

## 2017-08-13 RX ADMIN — MORPHINE SULFATE 2 MG: 2 INJECTION, SOLUTION INTRAMUSCULAR; INTRAVENOUS at 21:00

## 2017-08-13 RX ADMIN — INSULIN LISPRO 3 UNITS: 100 INJECTION, SOLUTION INTRAVENOUS; SUBCUTANEOUS at 12:40

## 2017-08-13 RX ADMIN — INSULIN LISPRO 2 UNITS: 100 INJECTION, SOLUTION INTRAVENOUS; SUBCUTANEOUS at 07:31

## 2017-08-13 RX ADMIN — CLINDAMYCIN PHOSPHATE 900 MG: 18 INJECTION, SOLUTION INTRAMUSCULAR; INTRAVENOUS at 14:44

## 2017-08-13 RX ADMIN — Medication 10 ML: at 14:44

## 2017-08-13 RX ADMIN — DOCUSATE SODIUM AND SENNOSIDES 2 TABLET: 8.6; 5 TABLET, FILM COATED ORAL at 08:56

## 2017-08-13 RX ADMIN — MORPHINE SULFATE 2 MG: 2 INJECTION, SOLUTION INTRAMUSCULAR; INTRAVENOUS at 07:52

## 2017-08-13 RX ADMIN — MORPHINE SULFATE 2 MG: 2 INJECTION, SOLUTION INTRAMUSCULAR; INTRAVENOUS at 02:17

## 2017-08-13 RX ADMIN — CLINDAMYCIN PHOSPHATE 900 MG: 18 INJECTION, SOLUTION INTRAMUSCULAR; INTRAVENOUS at 07:31

## 2017-08-13 RX ADMIN — MORPHINE SULFATE 2 MG: 2 INJECTION, SOLUTION INTRAMUSCULAR; INTRAVENOUS at 12:46

## 2017-08-13 RX ADMIN — AMPICILLIN SODIUM AND SULBACTAM SODIUM 3 G: 2; 1 INJECTION, POWDER, FOR SOLUTION INTRAMUSCULAR; INTRAVENOUS at 02:07

## 2017-08-13 RX ADMIN — AMPICILLIN SODIUM AND SULBACTAM SODIUM 3 G: 2; 1 INJECTION, POWDER, FOR SOLUTION INTRAMUSCULAR; INTRAVENOUS at 17:54

## 2017-08-13 RX ADMIN — Medication 10 ML: at 07:31

## 2017-08-13 RX ADMIN — Medication 10 ML: at 21:00

## 2017-08-13 RX ADMIN — CLINDAMYCIN PHOSPHATE 900 MG: 18 INJECTION, SOLUTION INTRAMUSCULAR; INTRAVENOUS at 02:06

## 2017-08-13 RX ADMIN — SODIUM CHLORIDE 125 ML/HR: 900 INJECTION, SOLUTION INTRAVENOUS at 08:56

## 2017-08-13 RX ADMIN — AMPICILLIN SODIUM AND SULBACTAM SODIUM 3 G: 2; 1 INJECTION, POWDER, FOR SOLUTION INTRAMUSCULAR; INTRAVENOUS at 09:00

## 2017-08-13 RX ADMIN — CLINDAMYCIN PHOSPHATE 900 MG: 18 INJECTION, SOLUTION INTRAMUSCULAR; INTRAVENOUS at 20:05

## 2017-08-13 NOTE — PROGRESS NOTES
Bedside shift change report given to Francisco Aleman (oncoming nurse) by Wing Schirmer (offgoing nurse). Report included the following information SBAR and Kardex.

## 2017-08-13 NOTE — PROGRESS NOTES
Hospitalist Progress Note  Jenn Will MD  Office: [phone]  Cell: [phone]      Date of Service:  2017  NAME:  Malissa Maldonado  :  1985  MRN:  [id number]      Admission Summary:     32 yo male with PMhx of DM, HTN, Obesity admitted for left facial swelling. Pt was seen in ER  for same and was given abx and discharge. Pt one of his tooth started hurting 1 day prior to swelling. Pt saw Dentist who gave abx and prescribed abx. Pt then saw oral surgeon who asked patient to come to hospital for IV abx. Pt unable to chew or fully open mounth. Interval history / Subjective:     Pt seen and examined  Pain improved  Feels that swelling in improving       Assessment & Plan:     Left Facial Cellulitis with abscess  - on Unasyn and Clinda  - ENT consult appreciated  - s/p Drainage of 2cc pus  - Drainage culture: GNR   - pain management  - stool softner ordered  - mechanically soft diet    DM type 2  - c/w sliding scale  - A1C 7    Reported Hx of HTN  - not meds  - BP okay, will hold on starting meds    Obesity   Body mass index is 34.28 kg/(m^2). Code status: Full  DVT prophylaxis: scds    Care Plan discussed with: Patient/Family, Nurse and Consultant Marlon Santiago  Disposition: Home w/Family     Hospital Problems  Date Reviewed: 2017          Codes Class Noted POA    * (Principal)Facial abscess ICD-10-CM: L02.01  ICD-9-CM: 682.0  2017 Unknown        Left-sided face pain ICD-10-CM: R51  ICD-9-CM: 784.0  2017 Unknown        Left facial swelling ICD-10-CM: R22.0  ICD-9-CM: 784.2  2017 Unknown                Review of Systems:   A comprehensive review of systems was negative except for that written in the HPI. Vital Signs:    Last 24hrs VS reviewed since prior progress note.  Most recent are:  Visit Vitals    /81 (BP 1 Location: Right arm, BP Patient Position: At rest)    Pulse 78    Temp 98 °F (36.7 °C)  Resp 16    Ht 6' 1\" (1.854 m)    Wt 117.8 kg (259 lb 12.8 oz)    SpO2 98%    BMI 34.28 kg/m2         Intake/Output Summary (Last 24 hours) at 08/13/17 1105  Last data filed at 08/13/17 0857   Gross per 24 hour   Intake             3489 ml   Output             2175 ml   Net             1314 ml        Physical Examination:             Constitutional:  No acute distress, cooperative, pleasant    ENT:  oral mucosa moist, left facial swelling with tenderness and left neck tenderness,    Resp:  CTA bilaterally. CV:  Regular rhythm, normal rate, no murmurs, gallops, rubs    GI:  Soft, non distended, non tender. normoactive bowel sounds    Musculoskeletal:  No edema, warm, 2+ pulses throughout    Neurologic:  Moves all extremities. AAOx3,     Skin:  Good turgor, no rashes or ulcers       Data Review:    Review and/or order of clinical lab test  Review and/or order of tests in the radiology section of CPT  Review and/or order of tests in the medicine section of CPT      Labs:     Recent Labs      08/12/17   0111  08/11/17   1617   WBC  5.0  6.6   HGB  13.0  16.0   HCT  37.3  45.6   PLT  285  289     Recent Labs      08/12/17   0111  08/11/17   1617   NA  137  135*   K  3.7  4.3   CL  100  98   CO2  31  30   BUN  12  18   CREA  0.77  1.14   GLU  131*  243*   CA  9.0  10.1     No results for input(s): SGOT, GPT, ALT, AP, TBIL, TBILI, TP, ALB, GLOB, GGT, AML, LPSE in the last 72 hours. No lab exists for component: AMYP, HLPSE  No results for input(s): INR, PTP, APTT in the last 72 hours. No lab exists for component: INREXT, INREXT   No results for input(s): FE, TIBC, PSAT, FERR in the last 72 hours. No results found for: FOL, RBCF   No results for input(s): PH, PCO2, PO2 in the last 72 hours. No results for input(s): CPK, CKNDX, TROIQ in the last 72 hours.     No lab exists for component: CPKMB  Lab Results   Component Value Date/Time    Cholesterol, total 188 04/10/2017 10:46 AM    HDL Cholesterol 42 04/10/2017 10:46 AM    LDL, calculated 120 04/10/2017 10:46 AM    Triglyceride 128 04/10/2017 10:46 AM    CHOL/HDL Ratio 8.0 10/05/2016 11:56 PM     Lab Results   Component Value Date/Time    Glucose (POC) 146 08/13/2017 05:47 AM    Glucose (POC) 151 08/12/2017 09:16 PM    Glucose (POC) 171 08/12/2017 11:11 AM    Glucose (POC) 157 08/12/2017 05:38 AM    Glucose (POC) 132 08/12/2017 01:04 AM     Lab Results   Component Value Date/Time    Color YELLOW/STRAW 10/06/2016 09:29 AM    Appearance CLEAR 10/06/2016 09:29 AM    Specific gravity >1.030 10/06/2016 09:29 AM    pH (UA) 5.0 10/06/2016 09:29 AM    Protein 30 10/06/2016 09:29 AM    Glucose >1000 10/06/2016 09:29 AM    Ketone 80 10/06/2016 09:29 AM    Bilirubin NEGATIVE  10/06/2016 09:29 AM    Urobilinogen 0.2 10/06/2016 09:29 AM    Nitrites NEGATIVE  10/06/2016 09:29 AM    Leukocyte Esterase NEGATIVE  10/06/2016 09:29 AM    Epithelial cells FEW 10/06/2016 09:29 AM    Bacteria NEGATIVE  10/06/2016 09:29 AM    WBC 0-4 10/06/2016 09:29 AM    RBC 0-5 10/06/2016 09:29 AM         Medications Reviewed:     Current Facility-Administered Medications   Medication Dose Route Frequency    sodium chloride (NS) flush 5-10 mL  5-10 mL IntraVENous Q8H    sodium chloride (NS) flush 5-10 mL  5-10 mL IntraVENous PRN    ondansetron (ZOFRAN) injection 4 mg  4 mg IntraVENous Q6H PRN    0.9% sodium chloride infusion  125 mL/hr IntraVENous CONTINUOUS    glucose chewable tablet 16 g  4 Tab Oral PRN    glucagon (GLUCAGEN) injection 1 mg  1 mg IntraMUSCular PRN    insulin lispro (HUMALOG) injection   SubCUTAneous Q6H    dextrose 10 % infusion 125-250 mL  125-250 mL IntraVENous PRN    ampicillin-sulbactam (UNASYN) 3 g in 0.9% sodium chloride (MBP/ADV) 100 mL  3 g IntraVENous Q8H    clindamycin (CLEOCIN) 900mg D5W 50mL IVPB (premix)  900 mg IntraVENous Q6H    morphine injection 2 mg  2 mg IntraVENous Q3H PRN    senna-docusate (PERICOLACE) 8.6-50 mg per tablet 2 Tab  2 Tab Oral DAILY ______________________________________________________________________  EXPECTED LENGTH OF STAY: - - -  ACTUAL LENGTH OF STAY:          2                 [place] MD [place]

## 2017-08-13 NOTE — PROGRESS NOTES
Bedside shift change report given to Maria Elena Tirado (oncoming nurse) by Juan Galeana (offgoing nurse). Report included the following information SBAR and Kardex.

## 2017-08-13 NOTE — PROGRESS NOTES
Problem: Falls - Risk of  Goal: *Absence of Falls  Document Romelia Fall Risk and appropriate interventions in the flowsheet.    Outcome: Progressing Towards Goal  Fall Risk Interventions:              Medication Interventions: Patient to call before getting OOB, Teach patient to arise slowly

## 2017-08-14 LAB
GLUCOSE BLD STRIP.AUTO-MCNC: 108 MG/DL (ref 65–100)
GLUCOSE BLD STRIP.AUTO-MCNC: 137 MG/DL (ref 65–100)
GLUCOSE BLD STRIP.AUTO-MCNC: 148 MG/DL (ref 65–100)
GLUCOSE BLD STRIP.AUTO-MCNC: 172 MG/DL (ref 65–100)
SERVICE CMNT-IMP: ABNORMAL

## 2017-08-14 PROCEDURE — 74011250637 HC RX REV CODE- 250/637: Performed by: HOSPITALIST

## 2017-08-14 PROCEDURE — 65270000029 HC RM PRIVATE

## 2017-08-14 PROCEDURE — 74011250636 HC RX REV CODE- 250/636: Performed by: HOSPITALIST

## 2017-08-14 PROCEDURE — 82962 GLUCOSE BLOOD TEST: CPT

## 2017-08-14 PROCEDURE — 74011000258 HC RX REV CODE- 258: Performed by: FAMILY MEDICINE

## 2017-08-14 PROCEDURE — 74011636637 HC RX REV CODE- 636/637: Performed by: FAMILY MEDICINE

## 2017-08-14 PROCEDURE — 74011250637 HC RX REV CODE- 250/637: Performed by: INTERNAL MEDICINE

## 2017-08-14 PROCEDURE — 74011250637 HC RX REV CODE- 250/637: Performed by: FAMILY MEDICINE

## 2017-08-14 PROCEDURE — 74011250636 HC RX REV CODE- 250/636: Performed by: FAMILY MEDICINE

## 2017-08-14 RX ORDER — CLINDAMYCIN HYDROCHLORIDE 150 MG/1
300 CAPSULE ORAL EVERY 6 HOURS
Status: DISCONTINUED | OUTPATIENT
Start: 2017-08-14 | End: 2017-08-15 | Stop reason: HOSPADM

## 2017-08-14 RX ORDER — HYDROCODONE BITARTRATE AND ACETAMINOPHEN 5; 325 MG/1; MG/1
2 TABLET ORAL
Status: DISCONTINUED | OUTPATIENT
Start: 2017-08-14 | End: 2017-08-15 | Stop reason: HOSPADM

## 2017-08-14 RX ORDER — HYDROCODONE BITARTRATE AND ACETAMINOPHEN 10; 325 MG/1; MG/1
2 TABLET ORAL
Status: DISCONTINUED | OUTPATIENT
Start: 2017-08-14 | End: 2017-08-14

## 2017-08-14 RX ORDER — MORPHINE SULFATE 10 MG/ML
2 INJECTION, SOLUTION INTRAMUSCULAR; INTRAVENOUS
Status: DISCONTINUED | OUTPATIENT
Start: 2017-08-14 | End: 2017-08-15 | Stop reason: HOSPADM

## 2017-08-14 RX ADMIN — AMPICILLIN SODIUM AND SULBACTAM SODIUM 3 G: 2; 1 INJECTION, POWDER, FOR SOLUTION INTRAMUSCULAR; INTRAVENOUS at 01:23

## 2017-08-14 RX ADMIN — Medication 10 ML: at 00:52

## 2017-08-14 RX ADMIN — SODIUM CHLORIDE 125 ML/HR: 900 INJECTION, SOLUTION INTRAVENOUS at 06:05

## 2017-08-14 RX ADMIN — CLINDAMYCIN PHOSPHATE 900 MG: 18 INJECTION, SOLUTION INTRAMUSCULAR; INTRAVENOUS at 08:24

## 2017-08-14 RX ADMIN — INSULIN LISPRO 2 UNITS: 100 INJECTION, SOLUTION INTRAVENOUS; SUBCUTANEOUS at 12:07

## 2017-08-14 RX ADMIN — CLINDAMYCIN HYDROCHLORIDE 300 MG: 150 CAPSULE ORAL at 23:11

## 2017-08-14 RX ADMIN — CLINDAMYCIN HYDROCHLORIDE 300 MG: 150 CAPSULE ORAL at 17:10

## 2017-08-14 RX ADMIN — Medication 1 CAPSULE: at 09:01

## 2017-08-14 RX ADMIN — DOCUSATE SODIUM AND SENNOSIDES 2 TABLET: 8.6; 5 TABLET, FILM COATED ORAL at 09:01

## 2017-08-14 RX ADMIN — AMPICILLIN SODIUM AND SULBACTAM SODIUM 3 G: 2; 1 INJECTION, POWDER, FOR SOLUTION INTRAMUSCULAR; INTRAVENOUS at 17:10

## 2017-08-14 RX ADMIN — HYDROCODONE BITARTRATE AND ACETAMINOPHEN 2 TABLET: 5; 325 TABLET ORAL at 17:09

## 2017-08-14 RX ADMIN — MORPHINE SULFATE 2 MG: 10 INJECTION, SOLUTION INTRAMUSCULAR; INTRAVENOUS at 09:19

## 2017-08-14 RX ADMIN — AMPICILLIN SODIUM AND SULBACTAM SODIUM 3 G: 2; 1 INJECTION, POWDER, FOR SOLUTION INTRAMUSCULAR; INTRAVENOUS at 09:20

## 2017-08-14 RX ADMIN — MORPHINE SULFATE 2 MG: 2 INJECTION, SOLUTION INTRAMUSCULAR; INTRAVENOUS at 00:49

## 2017-08-14 RX ADMIN — CLINDAMYCIN HYDROCHLORIDE 300 MG: 150 CAPSULE ORAL at 14:06

## 2017-08-14 RX ADMIN — Medication 10 ML: at 22:39

## 2017-08-14 RX ADMIN — CLINDAMYCIN PHOSPHATE 900 MG: 18 INJECTION, SOLUTION INTRAMUSCULAR; INTRAVENOUS at 01:22

## 2017-08-14 RX ADMIN — Medication 10 ML: at 08:25

## 2017-08-14 RX ADMIN — SODIUM CHLORIDE 125 ML/HR: 900 INJECTION, SOLUTION INTRAVENOUS at 15:55

## 2017-08-14 RX ADMIN — Medication 10 ML: at 14:06

## 2017-08-14 RX ADMIN — INSULIN LISPRO 2 UNITS: 100 INJECTION, SOLUTION INTRAVENOUS; SUBCUTANEOUS at 17:10

## 2017-08-14 NOTE — PROGRESS NOTES
Bedside shift change report given to delia glez (oncoming nurse) by delia perrytie Earthly nurse). Report included the following information SBAR, Kardex, Intake/Output, MAR and Recent Results.

## 2017-08-14 NOTE — PROGRESS NOTES
Hospitalist Progress Note  Tessie Daugherty MD  Office: [phone]        Date of Service:  2017  NAME:  Lakshmi Sharif  :  1985  MRN:  [id number]      Admission Summary:     32 yo male with PMhx of DM, HTN, Obesity admitted for left facial swelling. Pt was seen in ER 8/7 for same and was given abx and discharge. Pt one of his tooth started hurting 1 day prior to swelling. Pt saw Dentist who gave abx and prescribed abx. Pt then saw oral surgeon who asked patient to come to hospital for IV abx. Pt unable to chew or fully open mounth. Interval history / Subjective:    Pt says that the swelling is improving. Pain is less. Now able to open mouth. Assessment & Plan:     Left Facial Cellulitis with abscess  -Improving  -on Unasyn and Clinda  - ENT consult appreciated  - s/p Drainage of 2cc pus  - Drainage culture: GNR   - pain management  - stool softner ordered  - mechanically soft diet    DM type 2  - c/w sliding scale  - A1C 7    Reported Hx of HTN  - not meds  - BP okay, will hold on starting meds    Obesity   Body mass index is 34.28 kg/(m^2). Code status: Full  DVT prophylaxis: scds    Care Plan discussed with: Patient/Family, Nurse and Consultant Warden Rodriguez  Disposition: Home w/Family     Hospital Problems  Date Reviewed: 2017          Codes Class Noted POA    * (Principal)Facial abscess ICD-10-CM: L02.01  ICD-9-CM: 682.0  2017 Unknown        Left-sided face pain ICD-10-CM: R51  ICD-9-CM: 784.0  2017 Unknown        Left facial swelling ICD-10-CM: R22.0  ICD-9-CM: 784.2  2017 Unknown                Review of Systems:   A comprehensive review of systems was negative except for that written in the HPI. Vital Signs:    Last 24hrs VS reviewed since prior progress note.  Most recent are:  Visit Vitals    /87 (BP 1 Location: Right arm, BP Patient Position: At rest)    Pulse 85    Temp 97.1 °F (36.2 °C)  Resp 18    Ht 6' 1\" (1.854 m)    Wt 117.8 kg (259 lb 12.8 oz)    SpO2 94%    BMI 34.28 kg/m2         Intake/Output Summary (Last 24 hours) at 08/14/17 1644  Last data filed at 08/14/17 1556   Gross per 24 hour   Intake              100 ml   Output             1250 ml   Net            -1150 ml        Physical Examination:             Constitutional:  No acute distress, cooperative, pleasant    ENT:  oral mucosa moist, still left facial swelling now improving and able to open mouth easily     Resp:  CTA bilaterally. CV:  Regular rhythm, normal rate, no murmurs, gallops, rubs    GI:  Soft, non distended, non tender. normoactive bowel sounds    Musculoskeletal:  No edema, warm, 2+ pulses throughout    Neurologic:  Moves all extremities. AAOx3,     Skin:  Good turgor, no rashes or ulcers       Data Review:    Review and/or order of clinical lab test  Review and/or order of tests in the radiology section of CPT  Review and/or order of tests in the medicine section of CPT      Labs:     Recent Labs      08/12/17   0111   WBC  5.0   HGB  13.0   HCT  37.3   PLT  285     Recent Labs      08/12/17   0111   NA  137   K  3.7   CL  100   CO2  31   BUN  12   CREA  0.77   GLU  131*   CA  9.0     No results for input(s): SGOT, GPT, ALT, AP, TBIL, TBILI, TP, ALB, GLOB, GGT, AML, LPSE in the last 72 hours. No lab exists for component: AMYP, HLPSE  No results for input(s): INR, PTP, APTT in the last 72 hours. No lab exists for component: INREXT, INREXT   No results for input(s): FE, TIBC, PSAT, FERR in the last 72 hours. No results found for: FOL, RBCF   No results for input(s): PH, PCO2, PO2 in the last 72 hours. No results for input(s): CPK, CKNDX, TROIQ in the last 72 hours.     No lab exists for component: CPKMB  Lab Results   Component Value Date/Time    Cholesterol, total 188 04/10/2017 10:46 AM    HDL Cholesterol 42 04/10/2017 10:46 AM    LDL, calculated 120 04/10/2017 10:46 AM    Triglyceride 128 04/10/2017 10:46 AM    CHOL/HDL Ratio 8.0 10/05/2016 11:56 PM     Lab Results   Component Value Date/Time    Glucose (POC) 148 08/14/2017 12:03 PM    Glucose (POC) 137 08/14/2017 06:02 AM    Glucose (POC) 127 08/13/2017 11:21 PM    Glucose (POC) 128 08/13/2017 04:29 PM    Glucose (POC) 207 08/13/2017 12:09 PM     Lab Results   Component Value Date/Time    Color YELLOW/STRAW 10/06/2016 09:29 AM    Appearance CLEAR 10/06/2016 09:29 AM    Specific gravity >1.030 10/06/2016 09:29 AM    pH (UA) 5.0 10/06/2016 09:29 AM    Protein 30 10/06/2016 09:29 AM    Glucose >1000 10/06/2016 09:29 AM    Ketone 80 10/06/2016 09:29 AM    Bilirubin NEGATIVE  10/06/2016 09:29 AM    Urobilinogen 0.2 10/06/2016 09:29 AM    Nitrites NEGATIVE  10/06/2016 09:29 AM    Leukocyte Esterase NEGATIVE  10/06/2016 09:29 AM    Epithelial cells FEW 10/06/2016 09:29 AM    Bacteria NEGATIVE  10/06/2016 09:29 AM    WBC 0-4 10/06/2016 09:29 AM    RBC 0-5 10/06/2016 09:29 AM         Medications Reviewed:     Current Facility-Administered Medications   Medication Dose Route Frequency    morphine injection 2 mg  2 mg IntraVENous Q3H PRN    clindamycin (CLEOCIN) capsule 300 mg  300 mg Oral Q6H    HYDROcodone-acetaminophen (NORCO) 5-325 mg per tablet 2 Tab  2 Tab Oral Q6H PRN    lactobac ac& pc-s.therm-b.anim (DAKOTA Q/RISAQUAD)  1 Cap Oral DAILY    sodium chloride (NS) flush 5-10 mL  5-10 mL IntraVENous Q8H    sodium chloride (NS) flush 5-10 mL  5-10 mL IntraVENous PRN    ondansetron (ZOFRAN) injection 4 mg  4 mg IntraVENous Q6H PRN    0.9% sodium chloride infusion  125 mL/hr IntraVENous CONTINUOUS    glucose chewable tablet 16 g  4 Tab Oral PRN    glucagon (GLUCAGEN) injection 1 mg  1 mg IntraMUSCular PRN    insulin lispro (HUMALOG) injection   SubCUTAneous Q6H    dextrose 10 % infusion 125-250 mL  125-250 mL IntraVENous PRN    ampicillin-sulbactam (UNASYN) 3 g in 0.9% sodium chloride (MBP/ADV) 100 mL  3 g IntraVENous Q8H    senna-docusate (PERICOLACE) 8.6-50 mg per tablet 2 Tab  2 Tab Oral DAILY     ______________________________________________________________________  EXPECTED LENGTH OF STAY: 3d 9h  ACTUAL LENGTH OF STAY:          3                 Aline Brady MD

## 2017-08-14 NOTE — DIABETES MGMT
DTC Consult Note    Recommendations/ Comments: Patient reports that he takes Metformin regularly, monitors fasting blood sugars and avoids concentrated sweets as well as watches portion sizes. Encouraged him to monitor BID and include one 2 hour post prandial reading, especially while recovering from infection. Consult received for:  [x]             Assessment of home management    Chart reviewed and initial evaluation complete on Matias Fernandez. Patient is a 33 y.o. male with a history of Type 2 Diabetes on oral agent (monotherapy): metformin (generic) at home. BG monitoring at home 1 time per day. Patient reports BG levels at home fasting range: 110-130 mg/dL. Assessed and instructed patient on the following:   ·  interpretation of lab results, blood sugar goals, complications of diabetes mellitus and nutrition      Provided patient with the following: [x]             Survival skills education materials               [x]             Outpatient DTC contact number    Discussed with patient and/or family need for follow up appointment for diabetes management after discharge. A1c:   Lab Results   Component Value Date/Time    Hemoglobin A1c 7.0 08/12/2017 01:11 AM       Recent Glucose Results:   Lab Results   Component Value Date/Time    GLUCPOC 148 (H) 08/14/2017 12:03 PM    GLUCPOC 137 (H) 08/14/2017 06:02 AM    GLUCPOC 127 (H) 08/13/2017 11:21 PM        Lab Results   Component Value Date/Time    Creatinine 0.77 08/12/2017 01:11 AM     Estimated Creatinine Clearance: 187 mL/min (based on Cr of 0.77). Active Orders   Diet    DIET DIABETIC WITH OPTIONS Consistent Carb 1800kcal; Mechanical Soft        PO intake: No data found. Current hospital DM medication: Humalog for correction, normal sensitivity    Will continue to follow as needed. Thank you.   Maddi Lubin, MS, RN, CDE

## 2017-08-14 NOTE — PROGRESS NOTES
Clinical Pharmacy Note: Re: IV to PO Automatic Conversion - Antibiotic    Please note: Chito Thompson Oscar medication (clindamycin) has/have been changed from IV to PO based on the following criteria:    The patient:  1. Has received IV therapy for at least 48 hours   2. Has a functioning GI tract  - Taking scheduled oral medications  - Tolerating tube feeds at goal rate or a full liquid, soft, or regular diet         3. Is clinically stable        - Temperature < 100.4F for at least 24 hours        - WBC is trending down    This IV to PO conversion is based on the [employer] approved automatic conversion policy for eligible patients. Please call with questions.

## 2017-08-15 VITALS
SYSTOLIC BLOOD PRESSURE: 122 MMHG | RESPIRATION RATE: 18 BRPM | TEMPERATURE: 97.5 F | BODY MASS INDEX: 34.43 KG/M2 | HEIGHT: 73 IN | HEART RATE: 69 BPM | OXYGEN SATURATION: 97 % | WEIGHT: 259.8 LBS | DIASTOLIC BLOOD PRESSURE: 89 MMHG

## 2017-08-15 LAB
BACTERIA SPEC CULT: ABNORMAL
BACTERIA SPEC CULT: ABNORMAL
BASOPHILS # BLD AUTO: 0 K/UL (ref 0–0.1)
BASOPHILS # BLD: 0 % (ref 0–1)
EOSINOPHIL # BLD: 0.3 K/UL (ref 0–0.4)
EOSINOPHIL NFR BLD: 5 % (ref 0–7)
ERYTHROCYTE [DISTWIDTH] IN BLOOD BY AUTOMATED COUNT: 12.1 % (ref 11.5–14.5)
GLUCOSE BLD STRIP.AUTO-MCNC: 124 MG/DL (ref 65–100)
GLUCOSE BLD STRIP.AUTO-MCNC: 130 MG/DL (ref 65–100)
GLUCOSE BLD STRIP.AUTO-MCNC: 159 MG/DL (ref 65–100)
GRAM STN SPEC: ABNORMAL
GRAM STN SPEC: ABNORMAL
HCT VFR BLD AUTO: 35.9 % (ref 36.6–50.3)
HGB BLD-MCNC: 12.6 G/DL (ref 12.1–17)
LYMPHOCYTES # BLD AUTO: 42 % (ref 12–49)
LYMPHOCYTES # BLD: 3.1 K/UL (ref 0.8–3.5)
MCH RBC QN AUTO: 30.7 PG (ref 26–34)
MCHC RBC AUTO-ENTMCNC: 35.1 G/DL (ref 30–36.5)
MCV RBC AUTO: 87.3 FL (ref 80–99)
MONOCYTES # BLD: 0.4 K/UL (ref 0–1)
MONOCYTES NFR BLD AUTO: 6 % (ref 5–13)
NEUTS SEG # BLD: 3.5 K/UL (ref 1.8–8)
NEUTS SEG NFR BLD AUTO: 47 % (ref 32–75)
PLATELET # BLD AUTO: 310 K/UL (ref 150–400)
RBC # BLD AUTO: 4.11 M/UL (ref 4.1–5.7)
SERVICE CMNT-IMP: ABNORMAL
WBC # BLD AUTO: 7.4 K/UL (ref 4.1–11.1)

## 2017-08-15 PROCEDURE — 74011250636 HC RX REV CODE- 250/636: Performed by: FAMILY MEDICINE

## 2017-08-15 PROCEDURE — 74011250637 HC RX REV CODE- 250/637: Performed by: INTERNAL MEDICINE

## 2017-08-15 PROCEDURE — 74011636637 HC RX REV CODE- 636/637: Performed by: FAMILY MEDICINE

## 2017-08-15 PROCEDURE — 74011000258 HC RX REV CODE- 258: Performed by: FAMILY MEDICINE

## 2017-08-15 PROCEDURE — 85025 COMPLETE CBC W/AUTO DIFF WBC: CPT | Performed by: INTERNAL MEDICINE

## 2017-08-15 PROCEDURE — 74011250637 HC RX REV CODE- 250/637: Performed by: FAMILY MEDICINE

## 2017-08-15 PROCEDURE — 82962 GLUCOSE BLOOD TEST: CPT

## 2017-08-15 PROCEDURE — 36415 COLL VENOUS BLD VENIPUNCTURE: CPT | Performed by: INTERNAL MEDICINE

## 2017-08-15 PROCEDURE — 74011250637 HC RX REV CODE- 250/637: Performed by: HOSPITALIST

## 2017-08-15 RX ORDER — OXYCODONE AND ACETAMINOPHEN 10; 325 MG/1; MG/1
1 TABLET ORAL
Qty: 10 TAB | Refills: 0 | Status: SHIPPED | OUTPATIENT
Start: 2017-08-15 | End: 2018-05-11 | Stop reason: ALTCHOICE

## 2017-08-15 RX ORDER — CLINDAMYCIN HYDROCHLORIDE 150 MG/1
300 CAPSULE ORAL 4 TIMES DAILY
Qty: 56 CAP | Refills: 0 | Status: SHIPPED | OUTPATIENT
Start: 2017-08-15 | End: 2017-08-22

## 2017-08-15 RX ADMIN — Medication 1 CAPSULE: at 09:20

## 2017-08-15 RX ADMIN — AMPICILLIN SODIUM AND SULBACTAM SODIUM 3 G: 2; 1 INJECTION, POWDER, FOR SOLUTION INTRAMUSCULAR; INTRAVENOUS at 01:31

## 2017-08-15 RX ADMIN — CLINDAMYCIN HYDROCHLORIDE 300 MG: 150 CAPSULE ORAL at 17:11

## 2017-08-15 RX ADMIN — CLINDAMYCIN HYDROCHLORIDE 300 MG: 150 CAPSULE ORAL at 11:20

## 2017-08-15 RX ADMIN — CLINDAMYCIN HYDROCHLORIDE 300 MG: 150 CAPSULE ORAL at 06:25

## 2017-08-15 RX ADMIN — INSULIN LISPRO 2 UNITS: 100 INJECTION, SOLUTION INTRAVENOUS; SUBCUTANEOUS at 12:08

## 2017-08-15 RX ADMIN — Medication 10 ML: at 11:20

## 2017-08-15 RX ADMIN — HYDROCODONE BITARTRATE AND ACETAMINOPHEN 2 TABLET: 5; 325 TABLET ORAL at 09:22

## 2017-08-15 RX ADMIN — AMPICILLIN SODIUM AND SULBACTAM SODIUM 3 G: 2; 1 INJECTION, POWDER, FOR SOLUTION INTRAMUSCULAR; INTRAVENOUS at 09:19

## 2017-08-15 RX ADMIN — HYDROCODONE BITARTRATE AND ACETAMINOPHEN 2 TABLET: 5; 325 TABLET ORAL at 01:31

## 2017-08-15 RX ADMIN — SODIUM CHLORIDE 125 ML/HR: 900 INJECTION, SOLUTION INTRAVENOUS at 01:31

## 2017-08-15 RX ADMIN — Medication 10 ML: at 06:25

## 2017-08-15 NOTE — PROGRESS NOTES
Bedside shift change report given to Franci Chakraborty RN (oncoming nurse) by Roseanna Matias (offgoing nurse). Report included the following information SBAR.

## 2017-08-15 NOTE — PROGRESS NOTES
Hospitalist Progress Note  Júnior Marcus MD  Office: [phone]  Cell: [phone]        Date of Service:  8/15/2017  NAME:  Inna Saucedo  :  1985  MRN:  [id number]      Admission Summary:     34 yo male with PMhx of DM, HTN, Obesity admitted for left facial swelling. Pt was seen in ER 8/7 for same and was given abx and discharge. Pt one of his tooth started hurting 1 day prior to swelling. Pt saw Dentist who gave abx and prescribed abx. Pt then saw oral surgeon who asked patient to come to hospital for IV abx. Pt unable to chew or fully open mounth. Interval history / Subjective:    f/u facial cellulitis  Pt says that the swelling is improving. Pain is less. Now able to open mouth although not fully       Assessment & Plan:     Left Facial Cellulitis with abscess  -Improving  -on Unasyn and Clinda  - ENT consult appreciated  - s/p Drainage of 2cc pus  - Drainage culture: GNR   - pain management  - stool softner ordered  - mechanically soft diet  -Will discharge the patient on Clinda with probiotic    DM type 2  - c/w sliding scale  - A1C 7    Reported Hx of HTN  - no meds  - BP okay, will hold on starting meds  -Will stop fluids    Obesity   Body mass index is 34.28 kg/(m^2). Code status: Full  DVT prophylaxis: scds    Discharge home today    Care Plan discussed with: nurse, patient/family  Disposition: Home w/Family     Hospital Problems  Date Reviewed: 2017          Codes Class Noted POA    * (Principal)Facial abscess ICD-10-CM: L02.01  ICD-9-CM: 682.0  2017 Unknown        Left-sided face pain ICD-10-CM: R51  ICD-9-CM: 784.0  2017 Unknown        Left facial swelling ICD-10-CM: R22.0  ICD-9-CM: 784.2  2017 Unknown                Review of Systems:   A comprehensive review of systems was negative except for that written in the HPI. Vital Signs:    Last 24hrs VS reviewed since prior progress note.  Most recent are:  Visit Vitals    /83 (BP 1 Location: Right arm, BP Patient Position: At rest)    Pulse 71    Temp 97.7 °F (36.5 °C)    Resp 18    Ht 6' 1\" (1.854 m)    Wt 117.8 kg (259 lb 12.8 oz)    SpO2 95%    BMI 34.28 kg/m2         Intake/Output Summary (Last 24 hours) at 08/15/17 0854  Last data filed at 08/14/17 2258   Gross per 24 hour   Intake              715 ml   Output             1250 ml   Net             -535 ml        Physical Examination:             Constitutional:  No acute distress, cooperative, pleasant    ENT:  oral mucosa moist, still left facial swelling now improving and able to open mouth easily     Resp:  CTA bilaterally. CV:  Regular rhythm, normal rate, no murmurs, gallops, rubs    GI:  Soft, non distended, non tender. normoactive bowel sounds    Musculoskeletal:  No edema, warm, 2+ pulses throughout    Neurologic:  Moves all extremities. AAOx3,     Skin:  Good turgor, no rashes or ulcers       Data Review:    Review and/or order of clinical lab test  Review and/or order of tests in the radiology section of CPT  Review and/or order of tests in the medicine section of CPT      Labs:     Recent Labs      08/15/17   0404   WBC  7.4   HGB  12.6   HCT  35.9*   PLT  310     No results for input(s): NA, K, CL, CO2, BUN, CREA, GLU, CA, MG, PHOS, URICA in the last 72 hours. No results for input(s): SGOT, GPT, ALT, AP, TBIL, TBILI, TP, ALB, GLOB, GGT, AML, LPSE in the last 72 hours. No lab exists for component: AMYP, HLPSE  No results for input(s): INR, PTP, APTT in the last 72 hours. No lab exists for component: INREXT, INREXT   No results for input(s): FE, TIBC, PSAT, FERR in the last 72 hours. No results found for: FOL, RBCF   No results for input(s): PH, PCO2, PO2 in the last 72 hours. No results for input(s): CPK, CKNDX, TROIQ in the last 72 hours.     No lab exists for component: CPKMB  Lab Results   Component Value Date/Time    Cholesterol, total 188 04/10/2017 10:46 AM    HDL Cholesterol 42 04/10/2017 10:46 AM    LDL, calculated 120 04/10/2017 10:46 AM    Triglyceride 128 04/10/2017 10:46 AM    CHOL/HDL Ratio 8.0 10/05/2016 11:56 PM     Lab Results   Component Value Date/Time    Glucose (POC) 124 08/15/2017 05:35 AM    Glucose (POC) 108 08/14/2017 10:26 PM    Glucose (POC) 172 08/14/2017 04:47 PM    Glucose (POC) 148 08/14/2017 12:03 PM    Glucose (POC) 137 08/14/2017 06:02 AM     Lab Results   Component Value Date/Time    Color YELLOW/STRAW 10/06/2016 09:29 AM    Appearance CLEAR 10/06/2016 09:29 AM    Specific gravity >1.030 10/06/2016 09:29 AM    pH (UA) 5.0 10/06/2016 09:29 AM    Protein 30 10/06/2016 09:29 AM    Glucose >1000 10/06/2016 09:29 AM    Ketone 80 10/06/2016 09:29 AM    Bilirubin NEGATIVE  10/06/2016 09:29 AM    Urobilinogen 0.2 10/06/2016 09:29 AM    Nitrites NEGATIVE  10/06/2016 09:29 AM    Leukocyte Esterase NEGATIVE  10/06/2016 09:29 AM    Epithelial cells FEW 10/06/2016 09:29 AM    Bacteria NEGATIVE  10/06/2016 09:29 AM    WBC 0-4 10/06/2016 09:29 AM    RBC 0-5 10/06/2016 09:29 AM         Medications Reviewed:     Current Facility-Administered Medications   Medication Dose Route Frequency    morphine injection 2 mg  2 mg IntraVENous Q3H PRN    clindamycin (CLEOCIN) capsule 300 mg  300 mg Oral Q6H    HYDROcodone-acetaminophen (NORCO) 5-325 mg per tablet 2 Tab  2 Tab Oral Q6H PRN    lactobac ac& pc-s.therm-b.anim (DAKOTA Q/RISAQUAD)  1 Cap Oral DAILY    sodium chloride (NS) flush 5-10 mL  5-10 mL IntraVENous Q8H    sodium chloride (NS) flush 5-10 mL  5-10 mL IntraVENous PRN    ondansetron (ZOFRAN) injection 4 mg  4 mg IntraVENous Q6H PRN    0.9% sodium chloride infusion  125 mL/hr IntraVENous CONTINUOUS    glucose chewable tablet 16 g  4 Tab Oral PRN    glucagon (GLUCAGEN) injection 1 mg  1 mg IntraMUSCular PRN    insulin lispro (HUMALOG) injection   SubCUTAneous Q6H    dextrose 10 % infusion 125-250 mL  125-250 mL IntraVENous PRN    ampicillin-sulbactam (UNASYN) 3 g in 0.9% sodium chloride (MBP/ADV) 100 mL  3 g IntraVENous Q8H    senna-docusate (PERICOLACE) 8.6-50 mg per tablet 2 Tab  2 Tab Oral DAILY     ______________________________________________________________________  EXPECTED LENGTH OF STAY: 3d 9h  ACTUAL LENGTH OF STAY:          MD Timothy

## 2017-08-15 NOTE — PROGRESS NOTES
Problem: Falls - Risk of  Goal: *Absence of Falls  Document Romelia Fall Risk and appropriate interventions in the flowsheet.    Outcome: Progressing Towards Goal  Fall Risk Interventions:              Medication Interventions: Evaluate medications/consider consulting pharmacy, Teach patient to arise slowly

## 2017-08-15 NOTE — DISCHARGE SUMMARY
Discharge Summary       PATIENT ID: Sharita Lake  MRN: [id number]   YOB: 1985    DATE OF ADMISSION: 8/11/2017 11:09 PM    DATE OF DISCHARGE: 8/15/2017   PRIMARY CARE PROVIDER: Rica Adams MD     ATTENDING PHYSICIAN: Dr Travis Poole  DISCHARGING PROVIDER: Travis Poole MD    To contact this individual call [phone] and ask the  to page. If unavailable ask to be transferred the Adult Hospitalist Department. CONSULTATIONS: IP CONSULT TO OTOLARYNGOLOGY    PROCEDURES/SURGERIES: * No surgery found *    [address] COURSE:   Left Facial Cellulitis with abscess  -Improving  -on Unasyn and Clinda  - ENT consult appreciated  - s/p Drainage of 2cc pus  - Drainage culture: GNR   - pain management  - stool softner ordered  - mechanically soft diet  -Will discharge the patient on Clinda with probiotic    DM type 2  - c/w sliding scale  - A1C 7    Reported Hx of HTN  - no meds  - BP okay, will hold on starting meds  -Will stop fluids    Obesity   Body mass index is 34.28 kg/(m^2)          DISCHARGE DIAGNOSES / PLAN:      1. Facial cellulitis/abcess       PENDING TEST RESULTS:   At the time of discharge the following test results are still pending: none    FOLLOW UP APPOINTMENTS:    Follow-up Information     Follow up With Details Comments [address], [place] In 1 week  [address]      Karis Mohan MD In 1 week  [id number] [address] and [address] [place] [address]  [phone]             ADDITIONAL CARE RECOMMENDATIONS:   Follow up with ENT in 1 week    DIET: Diabetic Diet    ACTIVITY: Activity as tolerated      DISCHARGE MEDICATIONS:  Current Discharge Medication List      START taking these medications    Details   L. acidoph & paracasei- S therm- Bifido (DAKOTA-Q/RISAQUAD) 8 billion cell cap cap Take 1 Cap by mouth daily.   Qty: 15 Cap, Refills: 0         CONTINUE these medications which have CHANGED    Details   oxyCODONE-acetaminophen (PERCOCET 10)  mg per tablet Take 1 Tab by mouth every six (6) hours as needed for Pain. Max Daily Amount: 4 Tabs. Qty: 10 Tab, Refills: 0      clindamycin (CLEOCIN) 150 mg capsule Take 2 Caps by mouth four (4) times daily for 7 days. Qty: 56 Cap, Refills: 0         CONTINUE these medications which have NOT CHANGED    Details   ibuprofen 100 mg tablet Take 200 mg by mouth as needed for Pain. HYDROcodone-acetaminophen (NORCO) 7.5-325 mg per tablet Take 1 Tab by mouth every six (6) hours as needed for Pain. Max Daily Amount: 4 Tabs. Do not drive for 6 hours after taking, may impair ability to drive  Qty: 20 Tab, Refills: 0      metFORMIN (GLUCOPHAGE) 500 mg tablet Take 1 Tab by mouth two (2) times daily (with meals). Qty: 60 Tab, Refills: 5      diphenhydrAMINE (BENADRYL) 25 mg capsule Take 50 mg by mouth as needed. glucose blood VI test strips (ONETOUCH VERIO) strip Use twice daily. Qty: 50 Strip, Refills: 1      Lancets (ONE TOUCH DELICA) misc Use twice daily. Qty: 1 Each, Refills: 1         STOP taking these medications       ondansetron (ZOFRAN ODT) 8 mg disintegrating tablet Comments:   Reason for Stopping:                 NOTIFY YOUR PHYSICIAN FOR ANY OF THE FOLLOWING:   Fever over 101 degrees for 24 hours. Chest pain, shortness of breath, fever, chills, nausea, vomiting, diarrhea, change in mentation, falling, weakness, bleeding. Severe pain or pain not relieved by medications. Or, any other signs or symptoms that you may have questions about.     DISPOSITION:   x Home With:   OT  PT  HH  RN       Long term SNF/Inpatient Rehab    Independent/assisted living    Hospice    Other:       PATIENT CONDITION AT DISCHARGE:     Functional status    Poor     Deconditioned    x Independent      Cognition    x Lucid     Forgetful     Dementia      Catheters/lines (plus indication)    Martinez     PICC     PEG    x None      Code status    x Full code     DNR PHYSICAL EXAMINATION AT DISCHARGE:  Please see progress note      CHRONIC MEDICAL DIAGNOSES:  Problem List as of 8/15/2017  Date Reviewed: 8/12/2017          Codes Class Noted - Resolved    * (Principal)Facial abscess ICD-10-CM: L02.01  ICD-9-CM: 682.0  8/12/2017 - Present        Left-sided face pain ICD-10-CM: R51  ICD-9-CM: 784.0  8/12/2017 - Present        Left facial swelling ICD-10-CM: R22.0  ICD-9-CM: 784.2  8/12/2017 - Present        Controlled type 2 diabetes mellitus without complication, without long-term current use of insulin ([facility] ICD-10-CM: E11.9  ICD-9-CM: 250.00  8/12/2017 - Present        Ketonuria ICD-10-CM: R82.4  ICD-9-CM: 791.6  10/6/2016 - Present        Hyperglycemia ICD-10-CM: R73.9  ICD-9-CM: 790.29  10/5/2016 - Present        Rib pain ICD-10-CM: R07.81  ICD-9-CM: 786.50  2/24/2014 - Present        Erectile dysfunction ICD-10-CM: N52.9  ICD-9-CM: 607.84  2/24/2014 - Present        HTN (hypertension) ICD-10-CM: I10  ICD-9-CM: 401.9  11/7/2013 - Present        ADD (attention deficit disorder) ICD-10-CM: F98.8  ICD-9-CM: 314.00  11/7/2013 - Present              Greater than 37 minutes were spent with the patient on counseling and coordination of care    Signed:   Michelle Morocho MD  8/15/2017  11:41 AM

## 2017-08-15 NOTE — DISCHARGE INSTRUCTIONS
Discharge Instructions       PATIENT ID: Karen Wu  MRN: [id number]   YOB: 1985    DATE OF ADMISSION: 8/11/2017 11:09 PM    DATE OF DISCHARGE: 8/15/2017    PRIMARY CARE PROVIDER: Rina Hall MD     ATTENDING PHYSICIAN: Casper Richardson MD  DISCHARGING PROVIDER: Casper Richardson MD    To contact this individual call [phone] and ask the  to page. If unavailable ask to be transferred the Adult Hospitalist Department. DISCHARGE DIAGNOSES   Facial cellulitis/abscess    CONSULTATIONS: IP CONSULT TO OTOLARYNGOLOGY    PROCEDURES/SURGERIES: * No surgery found *    PENDING TEST RESULTS:   At the time of discharge the following test results are still pending: none    FOLLOW UP APPOINTMENTS:   Follow-up Information     Follow up With Details Comments Contact Info    Rina Hall MD In 1 week  [address] [facility] [address]      Magda Choi MD In 1 week  [address] [address] [facility]  [phone]             ADDITIONAL CARE RECOMMENDATIONS:   Follow up with ENT in one week    DIET: Diabetic Diet     ACTIVITY: Activity as tolerated      DISCHARGE MEDICATIONS:   See Medication Reconciliation Form    · It is important that you take the medication exactly as they are prescribed. · Keep your medication in the bottles provided by the pharmacist and keep a list of the medication names, dosages, and times to be taken in your wallet. · Do not take other medications without consulting your doctor. NOTIFY YOUR PHYSICIAN FOR ANY OF THE FOLLOWING:   Fever over 101 degrees for 24 hours. Chest pain, shortness of breath, fever, chills, nausea, vomiting, diarrhea, change in mentation, falling, weakness, bleeding. Severe pain or pain not relieved by medications. Or, any other signs or symptoms that you may have questions about.       DISPOSITION:   x Home With:   OT  PT  [place]  RN       SNF/Inpatient Rehab/LTAC Independent/assisted living    Hospice    Other:     CDMP Checked:   Yes x     PROBLEM LIST Updated:  Yes x       Signed:   Eloy Busch MD  8/15/2017  11:40 AM

## 2017-08-16 ENCOUNTER — PATIENT OUTREACH (OUTPATIENT)
Dept: FAMILY MEDICINE CLINIC | Age: 32
End: 2017-08-16

## 2017-08-16 NOTE — PROGRESS NOTES
Placed phone call to patient regarding 2 prescriptions. Left name and number for call back.     Spoke with wife she stated did not need prescriptions and would call pcp if needed any other meds

## 2017-08-16 NOTE — PROGRESS NOTES
NNTOCIP 1 [facility] 8/11-15/2017 : Left Facial Cellulitis with abscess. NN left VM for patient to please return my call. Patient needs PCP and ENT appointments for follow up.      _____________________________________________  Jay Lipscomb MD    [address]:   Left Facial Cellulitis with abscess  -Improving  -on Unasyn and Clinda  - ENT consult appreciated  - s/p Drainage of 2cc pus  - Drainage culture: GNR   - pain management  - stool softner ordered  - mechanically soft diet  -Will discharge the patient on Clinda with probiotic     DM type 2  - c/w sliding scale  - A1C 7     Reported Hx of HTN  - no meds  - BP okay, will hold on starting meds  -Will stop fluids    ADDITIONAL CARE RECOMMENDATIONS:   Follow up with ENT in 1 week    DISCHARGE MEDICATIONS:        Current Discharge Medication List             START taking these medications     Details   L. acidoph & paracasei- S therm- Bifido (DAKOTA-Q/RISAQUAD) 8 billion cell cap cap Take 1 Cap by mouth daily. Qty: 15 Cap, Refills: 0                 CONTINUE these medications which have CHANGED     Details   oxyCODONE-acetaminophen (PERCOCET 10)  mg per tablet Take 1 Tab by mouth every six (6) hours as needed for Pain. Max Daily Amount: 4 Tabs. Qty: 10 Tab, Refills: 0       clindamycin (CLEOCIN) 150 mg capsule Take 2 Caps by mouth four (4) times daily for 7 days. Qty: 56 Cap, Refills: 0                 CONTINUE these medications which have NOT CHANGED     Details   ibuprofen 100 mg tablet Take 200 mg by mouth as needed for Pain.       HYDROcodone-acetaminophen (NORCO) 7.5-325 mg per tablet Take 1 Tab by mouth every six (6) hours as needed for Pain. Max Daily Amount: 4 Tabs. Do not drive for 6 hours after taking, may impair ability to drive  Qty: 20 Tab, Refills: 0       metFORMIN (GLUCOPHAGE) 500 mg tablet Take 1 Tab by mouth two (2) times daily (with meals).   Qty: 60 Tab, Refills: 5       diphenhydrAMINE (BENADRYL) 25 mg capsule Take 50 mg by mouth as needed.       glucose blood VI test strips (ONETOUCH VERIO) strip Use twice daily. Qty: 50 Strip, Refills: 1       Lancets (ONE TOUCH DELICA) misc Use twice daily. Qty: 1 Each, Refills: 1                STOP taking these medications         ondansetron (ZOFRAN ODT) 8 mg disintegrating tablet Comments:   Reason for Stopping:              Microbiology   Results   Procedure Component Value Ref Range Date/Time   CULTURE, Tim Pen STAIN [[id number]] (Abnormal) Collected: 08/12/17 1035   Order Status: Completed Specimen: Wound from Mouth Updated: 08/15/17 1047    Special Requests: NO SPECIAL REQUESTS       GRAM STAIN 4+ WBCS SEEN               3+ SMALL GRAM NEGATIVE RODS    Culture result: LIGHT   MIXED ORAL [place]                LIGHT ANAEROBIC GRAM NEGATIVE RODS BETA LACTAMASE NEGATIVE (A)     CBC WITH AUTOMATED DIFF [[id number]] (Abnormal) Collected: 08/15/17 0404   Order Status: Completed Specimen: Whole Blood from Whole Blood Updated: 08/15/17 0434    WBC 7.4 4.1 - 11.1 K/uL     RBC 4.11 4.10 - 5.70 M/uL     HGB 12.6 12.1 - 17.0 g/dL     HCT 35.9 (L) 36.6 - 50.3 %     MCV 87.3 80.0 - 99.0 FL     MCH 30.7 26.0 - 34.0 PG     MCHC 35.1 30.0 - 36.5 g/dL     RDW 12.1 11.5 - 14.5 %     PLATELET 252 101 - 047 K/uL     NEUTROPHILS 47 32 - 75 %     LYMPHOCYTES 42 12 - 49 %     MONOCYTES 6 5 - 13 %     EOSINOPHILS 5 0 - 7 %     BASOPHILS 0 0 - 1 %     ABS. NEUTROPHILS 3.5 1.8 - 8.0 K/UL     ABS. LYMPHOCYTES 3.1 0.8 - 3.5 K/UL     ABS. MONOCYTES 0.4 0.0 - 1.0 K/UL     ABS. EOSINOPHILS 0.3 0.0 - 0.4 K/UL     ABS.  BASOPHILS 0.0 0.0 - 0.1 K/UL      METABOLIC PANEL, BASIC [[id number]] (Abnormal) Collected: 08/12/17 0111   Order Status: Completed Specimen: Serum from Serum or Plasma Updated: 08/12/17 0234    Sodium 137 136 - 145 mmol/L     Potassium 3.7 3.5 - 5.1 mmol/L     Chloride 100 97 - 108 mmol/L     CO2 31 21 - 32 mmol/L     Anion gap 6 5 - 15 mmol/L     Glucose 131 (H) 65 - 100 mg/dL     BUN 12 6 - 20 MG/DL Creatinine 0.77 0.70 - 1.30 MG/DL     BUN/Creatinine ratio 16 12 - 20       GFR est AA >60 >60 ml/min/1.73m2     GFR est non-AA >60 >60 ml/min/1.73m2      HEMOGLOBIN A1C [[id number]] (Abnormal) Collected: 08/12/17 0111   Order Status: Completed Specimen: Whole Blood from Whole Blood Updated: 08/12/17 0217    Hemoglobin A1c 7.0 (H) 4.2 - 6.3 %     Est. average glucose 154 mg/dL

## 2017-08-21 ENCOUNTER — OFFICE VISIT (OUTPATIENT)
Dept: FAMILY MEDICINE CLINIC | Age: 32
End: 2017-08-21

## 2017-08-21 VITALS
TEMPERATURE: 97 F | SYSTOLIC BLOOD PRESSURE: 127 MMHG | BODY MASS INDEX: 33.88 KG/M2 | HEART RATE: 100 BPM | OXYGEN SATURATION: 96 % | DIASTOLIC BLOOD PRESSURE: 85 MMHG | HEIGHT: 73 IN | WEIGHT: 255.6 LBS | RESPIRATION RATE: 20 BRPM

## 2017-08-21 DIAGNOSIS — I10 ESSENTIAL HYPERTENSION: ICD-10-CM

## 2017-08-21 DIAGNOSIS — L02.01 FACIAL ABSCESS: Primary | ICD-10-CM

## 2017-08-21 DIAGNOSIS — E11.9 CONTROLLED TYPE 2 DIABETES MELLITUS WITHOUT COMPLICATION, WITHOUT LONG-TERM CURRENT USE OF INSULIN (HCC): ICD-10-CM

## 2017-08-21 NOTE — MR AVS SNAPSHOT
Visit Information Date & Time Provider Department Dept. Phone Encounter #  
 8/21/2017  8:00 AM Halle Whitaker MD [address] [place] [address] [id number] Follow-up Instructions Return in about 4 months (around 12/21/2017). Upcoming Health Maintenance Date Due  
 FOOT EXAM Q1 3/12/1996 MICROALBUMIN Q1 3/12/1996 EYE EXAM RETINAL OR DILATED Q1 3/12/1996 Pneumococcal 19-64 Medium Risk (1 of 1 - PPSV23) 3/12/2005 INFLUENZA AGE 9 TO ADULT 8/1/2017 HEMOGLOBIN A1C Q6M 2/12/2018 LIPID PANEL Q1 4/10/2018 DTaP/Tdap/Td series (2 - Td) 10/6/2026 Allergies as of 8/21/2017  Review Complete On: 8/21/2017 By: Nuris Roach No Known Allergies Current Immunizations  Never Reviewed Name Date Tdap 10/6/2016 11:34 AM  
  
 Not reviewed this visit You Were Diagnosed With   
  
 Codes Comments Facial abscess    -  Primary ICD-10-CM: L02.01 
ICD-9-CM: 682.0 Essential hypertension     ICD-10-CM: I10 
ICD-9-CM: 401.9 Controlled type 2 diabetes mellitus without complication, without long-term current use of insulin ([facility]     ICD-10-CM: E11.9 ICD-9-CM: 250.00 Vitals BP Pulse Temp Resp Height(growth percentile) Weight(growth percentile) 127/85 100 97 °F (36.1 °C) (Oral) 20 6' 1\" (1.854 m) 255 lb 9.6 oz (115.9 kg) SpO2 BMI Smoking Status 96% 33.72 kg/m2 Never Smoker Vitals History BMI and BSA Data Body Mass Index Body Surface Area 33.72 kg/m 2 2.44 m 2 Preferred Pharmacy Pharmacy Name Phone [facility] 79 [phone] Your Updated Medication List  
  
   
This list is accurate as of: 8/21/17  8:49 AM.  Always use your most recent med list.  
  
  
  
  
 BENADRYL 25 mg capsule Generic drug:  diphenhydrAMINE Take 50 mg by mouth as needed. clindamycin 150 mg capsule Commonly known as:  CLEOCIN  
 Take 2 Caps by mouth four (4) times daily for 7 days. glucose blood VI test strips strip Commonly known as:  [place] Use twice daily. HYDROcodone-acetaminophen 7.5-325 mg per tablet Commonly known as:  Anneliese Clyde Take 1 Tab by mouth every six (6) hours as needed for Pain. Max Daily Amount: 4 Tabs. Do not drive for 6 hours after taking, may impair ability to drive  
  
 ibuprofen 100 mg tablet Take 200 mg by mouth as needed for Pain. L. acidoph & paracasei- S therm- Bifido 8 billion cell Cap cap Commonly known as:  DAKOTA-Q/RISAQUAD Take 1 Cap by mouth daily. Lancets Misc Commonly known as: One Touch Rodríguez Scar Use twice daily. metFORMIN 500 mg tablet Commonly known as:  GLUCOPHAGE Take 1 Tab by mouth two (2) times daily (with meals). oxyCODONE-acetaminophen  mg per tablet Commonly known as:  PERCOCET 10 Take 1 Tab by mouth every six (6) hours as needed for Pain. Max Daily Amount: 4 Tabs. Follow-up Instructions Return in about 4 months (around 12/21/2017). Patient Instructions Learning About Diabetes Food Guidelines Your Care Instructions Meal planning is important to manage diabetes. It helps keep your blood sugar at a target level (which you set with your doctor). You don't have to eat special foods. You can eat what your family eats, including sweets once in a while. But you do have to pay attention to how often you eat and how much you eat of certain foods. You may want to work with a dietitian or a certified diabetes educator (CDE) to help you plan meals and snacks. A dietitian or CDE can also help you lose weight if that is one of your goals. What should you know about eating carbs? Managing the amount of carbohydrate (carbs) you eat is an important part of healthy meals when you have diabetes. Carbohydrate is found in many foods. · Learn which foods have carbs.  And learn the amounts of carbs in different foods. ¨ Bread, cereal, pasta, and rice have about 15 grams of carbs in a serving. A serving is 1 slice of bread (1 ounce), ½ cup of cooked cereal, or 1/3 cup of cooked pasta or rice. ¨ Fruits have 15 grams of carbs in a serving. A serving is 1 small fresh fruit, such as an apple or orange; ½ of a banana; ½ cup of cooked or canned fruit; ½ cup of fruit juice; 1 cup of melon or raspberries; or 2 tablespoons of dried fruit. ¨ Milk and no-sugar-added yogurt have 15 grams of carbs in a serving. A serving is 1 cup of milk or 2/3 cup of no-sugar-added yogurt. ¨ Starchy vegetables have 15 grams of carbs in a serving. A serving is ½ cup of mashed potatoes or sweet potato; 1 cup winter squash; ½ of a small baked potato; ½ cup of cooked beans; or ½ cup cooked corn or green peas. · Learn how much carbs to eat each day and at each meal. A dietitian or CDE can teach you how to keep track of the amount of carbs you eat. This is called carbohydrate counting. · If you are not sure how to count carbohydrate grams, use the Plate Method to plan meals. It is a good, quick way to make sure that you have a balanced meal. It also helps you spread carbs throughout the day. ¨ Divide your plate by types of foods. Put non-starchy vegetables on half the plate, meat or other protein food on one-quarter of the plate, and a grain or starchy vegetable in the final quarter of the plate. To this you can add a small piece of fruit and 1 cup of milk or yogurt, depending on how many carbs you are supposed to eat at a meal. 
· Try to eat about the same amount of carbs at each meal. Do not \"save up\" your daily allowance of carbs to eat at one meal. 
· Proteins have very little or no carbs per serving. Examples of proteins are beef, chicken, turkey, fish, eggs, tofu, cheese, cottage cheese, and peanut butter.  A serving size of meat is 3 ounces, which is about the size of a deck of cards. Examples of meat substitute serving sizes (equal to 1 ounce of meat) are 1/4 cup of cottage cheese, 1 egg, 1 tablespoon of peanut butter, and ½ cup of tofu. How can you eat out and still eat healthy? · Learn to estimate the serving sizes of foods that have carbohydrate. If you measure food at home, it will be easier to estimate the amount in a serving of restaurant food. · If the meal you order has too much carbohydrate (such as potatoes, corn, or baked beans), ask to have a low-carbohydrate food instead. Ask for a salad or green vegetables. · If you use insulin, check your blood sugar before and after eating out to help you plan how much to eat in the future. · If you eat more carbohydrate at a meal than you had planned, take a walk or do other exercise. This will help lower your blood sugar. What else should you know? · Limit saturated fat, such as the fat from meat and dairy products. This is a healthy choice because people who have diabetes are at higher risk of heart disease. So choose lean cuts of meat and nonfat or low-fat dairy products. Use olive or canola oil instead of butter or shortening when cooking. · Don't skip meals. Your blood sugar may drop too low if you skip meals and take insulin or certain medicines for diabetes. · Check with your doctor before you drink alcohol. Alcohol can cause your blood sugar to drop too low. Alcohol can also cause a bad reaction if you take certain diabetes medicines. Follow-up care is a key part of your treatment and safety. Be sure to make and go to all appointments, and call your doctor if you are having problems. It's also a good idea to know your test results and keep a list of the medicines you take. Where can you learn more? Go to http://jaswant-oneal.info/. Enter U450 in the search box to learn more about \"Learning About Diabetes Food Guidelines. \" Current as of: March 13, 2017 Content Version: 11.3 © 8565-6835 Healthwise, Incorporated. Care instructions adapted under license by [employer] (which disclaims liability or warranty for this information). If you have questions about a medical condition or this instruction, always ask your healthcare professional. [address] any warranty or liability for your use of this information. Introducing [facility]! Shanell Phelps introduces Epiclist patient portal. Now you can access parts of your medical record, email your doctor's office, and request medication refills online. 1. In your internet browser, go to [email or web address]. [email or web address]/[place] 2. Click on the First Time User? Click Here link in the Sign In box. You will see the New Member Sign Up page. 3. Enter your Epiclist Access Code exactly as it appears below. You will not need to use this code after youve completed the sign-up process. If you do not sign up before the expiration date, you must request a new code. · Epiclist Access Code: [email or web address] Expires: 11/5/2017  8:07 AM 
 
4. Enter the last four digits of your Social Security Number (xxxx) and Date of Birth (mm/dd/yyyy) as indicated and click Submit. You will be taken to the next sign-up page. 5. Create a Epiclist ID. This will be your Epiclist login ID and cannot be changed, so think of one that is secure and easy to remember. 6. Create a Epiclist password. You can change your password at any time. 7. Enter your Password Reset Question and Answer. This can be used at a later time if you forget your password. 8. Enter your e-mail address. You will receive e-mail notification when new information is available in [address] 9. Click Sign Up. You can now view and download portions of your medical record. 10. Click the Download Summary menu link to download a portable copy of your medical information.  
 
If you have questions, please visit the Frequently Asked Questions section of the [employer]. Remember, [employer] is NOT to be used for urgent needs. For medical emergencies, dial 911. Now available from your iPhone and Android! Please provide this summary of care documentation to your next provider. Your primary care clinician is listed as Se Jones. If you have any questions after today's visit, please call [phone].

## 2017-08-21 NOTE — PATIENT INSTRUCTIONS

## 2017-08-21 NOTE — PROGRESS NOTES
Health Maintenance Due   Topic Date Due    FOOT EXAM Q1  03/12/1996    MICROALBUMIN Q1  03/12/1996    EYE EXAM RETINAL OR DILATED Q1  03/12/1996    Pneumococcal 19-64 Medium Risk (1 of 1 - PPSV23) 03/12/2005    INFLUENZA AGE 9 TO ADULT  08/01/2017       Diabetic Bundle:  LDL-120  A1C-7.0  BP-  Smoking?no  Anticoagulation medication? no  Eye exam dilated?good  Foot exam?good

## 2017-08-29 NOTE — PROGRESS NOTES
Patient: Donny Agrawal MRN: [id number]  SSN: [id number]    YOB: 1985  Age: 33 y.o. Sex: male        Subjective:     Chief Complaint   [facility]   [facility]       HPI: he is a 33y.o. year old male who presents with wife for follow up of hospitalization for facial swelling and pain secondary to abscess. He says he is feeling much better at this time. He has a follow up scheduled with dentist. Patient denies F/C, HA, dysphagia, dizziness, SOB, CP, abdominal pain, dysuria, acute myalgias or arthralgias. Patient with hx of DM2 which was controlled with insulin in hospital.     Encounter Diagnoses   Name Primary?  Facial abscess Yes    Essential hypertension     Controlled type 2 diabetes mellitus without complication, without long-term current use of insulin ([facility])      BP Readings from Last 3 Encounters:   08/21/17 127/85   08/15/17 122/89   08/11/17 138/76     Wt Readings from Last 3 Encounters:   08/21/17 255 lb 9.6 oz (115.9 kg)   08/11/17 259 lb 12.8 oz (117.8 kg)   08/11/17 257 lb 9.6 oz (116.8 kg)     Body mass index is 33.72 kg/(m^2). Patient Active Problem List    Diagnosis Date Noted    Facial abscess 08/12/2017    Left-sided face pain 08/12/2017    Left facial swelling 08/12/2017    Controlled type 2 diabetes mellitus without complication, without long-term current use of insulin ([facility] 08/12/2017    Ketonuria 10/06/2016    Hyperglycemia 10/05/2016    Rib pain 02/24/2014    Erectile dysfunction 02/24/2014    HTN (hypertension) 11/07/2013    ADD (attention deficit disorder) 11/07/2013       Past Medical History:   Diagnosis Date    Diabetes ([address]     HTN (hypertension) 11/7/2013       No Known Allergies    History reviewed. No pertinent surgical history.     Social History     Social History    Marital status:      Spouse name: N/A    Number of children: N/A    Years of education: N/A     Social History Main Topics    Smoking status: Never Smoker    Smokeless tobacco: Never Used    Alcohol use No    Drug use: No    Sexual activity: Not Asked     Other Topics Concern    None     Social History Narrative         Objective:     Review of Systems:  Constitutional: Negative for fatigue or malaise  Derm: Negative for rash or lesion  HEENT: Negative for acute hearing or vision changes  Cardiovascular: Negative for dizziness, chest pain or palpitations  Respiratory: Negative for cough, wheezing or SOB  Gastreintestinal: Negative for nausea or abdominal pain  Genital/urinary: Negative for dysuria or voiding dysfunction  Muscoloskeletal: Negative for acute myalgias or arthralgias   Neurological: Negative for headache, weakness or paresthesia  Psychological: Negative for depression or anxiety        Vitals:    08/21/17 0810   BP: 127/85   Pulse: 100   Resp: 20   Temp: 97 °F (36.1 °C)   TempSrc: Oral   SpO2: 96%   Weight: 255 lb 9.6 oz (115.9 kg)   Height: 6' 1\" (1.854 m)      Body mass index is 33.72 kg/(m^2). Physical Exam:  Constitutional: well developed, well nourished, in no acute distress  Skin: warm and dry, normal tone and turgor  Head: normocephalic, atraumatic  Eyes: sclera clear, EOMI  Neck: normal range of motion  Cardiovascular: normal S1, S2, regular rate and rhythm  Respiratory: clear to auscultation bilaterally with symmetrical effort  Abdomen: soft, BS normal  Extremities: full range of motion  Neurology: normal strength and sensation  Psych: active, alert and oriented, affect appropriate       Assessment and orders:       ICD-10-CM ICD-9-CM    1. Facial abscess L02.01 682.0    2. Essential hypertension I10 401.9    3. Controlled type 2 diabetes mellitus without complication, without long-term current use of insulin ([facility] E11.9 250.00          Plan of care:  Hospital medical records reviewed. Diagnoses were discussed with patient and wife. Medication risks/benefits/side effects discussed with patient.    All of the patient's questions were addressed and answered to apparent satisfaction. The patient knows to call back if they have questions about the plan of care or if symptoms change. The patient received an After-Visit Summary which contains VS, diagnoses, orders, allergy and medication lists. Patient Care Team:  Dana Perera MD as PCP - General (Family Practice)  Murray James RN as Ambulatory Care Navigator    Follow-up Disposition:  Return in about 4 months (around 12/21/2017). No future appointments.     Signed By: Dana Perera MD     August 28, 2017

## 2018-05-11 ENCOUNTER — OFFICE VISIT (OUTPATIENT)
Dept: FAMILY MEDICINE CLINIC | Age: 33
End: 2018-05-11

## 2018-05-11 VITALS
HEIGHT: 73 IN | WEIGHT: 263 LBS | HEART RATE: 84 BPM | TEMPERATURE: 97 F | SYSTOLIC BLOOD PRESSURE: 136 MMHG | BODY MASS INDEX: 34.85 KG/M2 | DIASTOLIC BLOOD PRESSURE: 83 MMHG | RESPIRATION RATE: 18 BRPM | OXYGEN SATURATION: 98 %

## 2018-05-11 DIAGNOSIS — E11.9 CONTROLLED TYPE 2 DIABETES MELLITUS WITHOUT COMPLICATION, WITHOUT LONG-TERM CURRENT USE OF INSULIN (HCC): Primary | ICD-10-CM

## 2018-05-11 DIAGNOSIS — I10 ESSENTIAL HYPERTENSION: ICD-10-CM

## 2018-05-11 DIAGNOSIS — E66.01 CLASS 2 SEVERE OBESITY DUE TO EXCESS CALORIES WITH SERIOUS COMORBIDITY AND BODY MASS INDEX (BMI) OF 35.0 TO 35.9 IN ADULT (HCC): ICD-10-CM

## 2018-05-11 LAB
BILIRUB UR QL STRIP: NEGATIVE
GLUCOSE UR-MCNC: NEGATIVE MG/DL
KETONES P FAST UR STRIP-MCNC: NEGATIVE MG/DL
PH UR STRIP: 6 [PH] (ref 4.6–8)
PROT UR QL STRIP: NEGATIVE
SP GR UR STRIP: 1.02 (ref 1–1.03)
UA UROBILINOGEN AMB POC: NORMAL (ref 0.2–1)
URINALYSIS CLARITY POC: NORMAL
URINALYSIS COLOR POC: YELLOW
URINE BLOOD POC: NEGATIVE
URINE LEUKOCYTES POC: NEGATIVE
URINE NITRITES POC: NEGATIVE

## 2018-05-11 NOTE — MR AVS SNAPSHOT
[address] [address] 
[phone] Patient: Reji Balbuena MRN: [id number] :1985 Visit Information Date & Time Provider Department Dept. Phone Encounter #  
 2018  3:40 PM Ambreen Jensen  [facility] [phone] [id number] Upcoming Health Maintenance Date Due  
 FOOT EXAM Q1 3/12/1996 MICROALBUMIN Q1 3/12/1996 EYE EXAM RETINAL OR DILATED Q1 3/12/1996 Pneumococcal 19-64 Medium Risk (1 of 1 - PPSV23) 3/12/2005 HEMOGLOBIN A1C Q6M 2018 LIPID PANEL Q1 4/10/2018 Influenza Age 5 to Adult 2018 DTaP/Tdap/Td series (2 - Td) 10/6/2026 Allergies as of 2018  Review Complete On: 2018 By: Marissa Escalera LPN No Known Allergies Current Immunizations  Never Reviewed Name Date Tdap 10/6/2016 11:34 AM  
  
 Not reviewed this visit You Were Diagnosed With   
  
 Codes Comments Controlled type 2 diabetes mellitus without complication, without long-term current use of insulin [facility]    -  Primary ICD-10-CM: E11.9 ICD-9-CM: 250.00 Vitals BP Pulse Temp Resp Height(growth percentile) Weight(growth percentile) 136/83 (BP 1 Location: Right arm, BP Patient Position: Sitting) 84 97 °F (36.1 °C) (Oral) 18 6' 1\" (1.854 m) 263 lb (119.3 kg) SpO2 BMI Smoking Status 98% 34.7 kg/m2 Never Smoker Vitals History BMI and BSA Data Body Mass Index Body Surface Area 34.7 kg/m 2 2.48 m 2 Preferred Pharmacy Pharmacy Name Phone [address] [phone] Your Updated Medication List  
  
   
This list is accurate as of 18  4:24 PM.  Always use your most recent med list.  
  
  
  
  
 glucose blood VI test strips strip Commonly known as:  Wolm Litten Use twice daily. ibuprofen 100 mg tablet Take 200 mg by mouth as needed for Pain. Lancets Misc Commonly known as: One Touch Lylia Chew Use twice daily. metFORMIN 500 mg tablet Commonly known as:  GLUCOPHAGE  
TAKE ONE TABLET BY MOUTH TWICE DAILY WITH MEALS We Performed the Following HEMOGLOBIN A1C WITH EAG [77670 CPT(R)] HGB & HCT [31683 CPT(R)] LIPID PANEL [41947 CPT(R)] METABOLIC PANEL, COMPREHENSIVE [66621 CPT(R)] TSH 3RD GENERATION [80508 CPT(R)] Introducing [address]! [facility] introduces [employer] patient portal. Now you can access parts of your medical record, email your doctor's office, and request medication refills online. 1. In your internet browser, go to [email or web address]. [email or web address] 2. Click on the First Time User? Click Here link in the Sign In box. You will see the New Member Sign Up page. 3. Enter your [employer] Access Code exactly as it appears below. You will not need to use this code after youve completed the sign-up process. If you do not sign up before the expiration date, you must request a new code. · [employer] Access Code: [email or web address] Expires: 8/9/2018  3:34 PM 
 
4. Enter the last four digits of your Social Security Number (xxxx) and Date of Birth (mm/dd/yyyy) as indicated and click Submit. You will be taken to the next sign-up page. 5. Create a [employer] ID. This will be your [employer] login ID and cannot be changed, so think of one that is secure and easy to remember. 6. Create a [employer] password. You can change your password at any time. 7. Enter your Password Reset Question and Answer. This can be used at a later time if you forget your password. 8. Enter your e-mail address. You will receive e-mail notification when new information is available in [address] 9. Click Sign Up. You can now view and download portions of your medical record. 10. Click the Download Summary menu link to download a portable copy of your medical information. If you have questions, please visit the Frequently Asked Questions section of the [employer] website. Remember, [employer] is NOT to be used for urgent needs. For medical emergencies, dial 911. Now available from your iPhone and Android! Please provide this summary of care documentation to your next provider. Your primary care clinician is listed as Sue Vargas. If you have any questions after today's visit, please call [phone].

## 2018-05-11 NOTE — PROGRESS NOTES
1. Have you been to the ER, urgent care clinic since your last visit? Hospitalized since your last visit? No    2. Have you seen or consulted any other health care providers outside of the [address] [place] [address] since your last visit? Include any pap smears or colon screening.  No  Reviewed record in preparation for visit and have necessary documentation  Pt did not bring medication to office visit for review  opportunity was given for questions  Goals that were addressed and/or need to be completed during or after this appointment include    Health Maintenance Due   Topic Date Due    FOOT EXAM Q1  03/12/1996    MICROALBUMIN Q1  03/12/1996    EYE EXAM RETINAL OR DILATED Q1  03/12/1996    Pneumococcal 19-64 Medium Risk (1 of 1 - PPSV23) 03/12/2005    HEMOGLOBIN A1C Q6M  02/12/2018    LIPID PANEL Q1  04/10/2018

## 2018-05-12 LAB
ALBUMIN SERPL-MCNC: 4.9 G/DL (ref 3.5–5.5)
ALBUMIN/GLOB SERPL: 1.9 {RATIO} (ref 1.2–2.2)
ALP SERPL-CCNC: 67 IU/L (ref 39–117)
ALT SERPL-CCNC: 47 IU/L (ref 0–44)
AST SERPL-CCNC: 23 IU/L (ref 0–40)
BILIRUB SERPL-MCNC: 0.5 MG/DL (ref 0–1.2)
BUN SERPL-MCNC: 16 MG/DL (ref 6–20)
BUN/CREAT SERPL: 17 (ref 9–20)
CALCIUM SERPL-MCNC: 9.9 MG/DL (ref 8.7–10.2)
CHLORIDE SERPL-SCNC: 99 MMOL/L (ref 96–106)
CHOLEST SERPL-MCNC: 175 MG/DL (ref 100–199)
CO2 SERPL-SCNC: 24 MMOL/L (ref 18–29)
CREAT SERPL-MCNC: 0.92 MG/DL (ref 0.76–1.27)
EST. AVERAGE GLUCOSE BLD GHB EST-MCNC: 148 MG/DL
GFR SERPLBLD CREATININE-BSD FMLA CKD-EPI: 110 ML/MIN/1.73
GFR SERPLBLD CREATININE-BSD FMLA CKD-EPI: 127 ML/MIN/1.73
GLOBULIN SER CALC-MCNC: 2.6 G/DL (ref 1.5–4.5)
GLUCOSE SERPL-MCNC: 142 MG/DL (ref 65–99)
HBA1C MFR BLD: 6.8 % (ref 4.8–5.6)
HCT VFR BLD AUTO: 43.6 % (ref 37.5–51)
HDLC SERPL-MCNC: 33 MG/DL
HGB BLD-MCNC: 15.1 G/DL (ref 13–17.7)
LDLC SERPL CALC-MCNC: 110 MG/DL (ref 0–99)
POTASSIUM SERPL-SCNC: 4.4 MMOL/L (ref 3.5–5.2)
PROT SERPL-MCNC: 7.5 G/DL (ref 6–8.5)
SODIUM SERPL-SCNC: 142 MMOL/L (ref 134–144)
TRIGL SERPL-MCNC: 162 MG/DL (ref 0–149)
TSH SERPL DL<=0.005 MIU/L-ACNC: 2.96 UIU/ML (ref 0.45–4.5)
VLDLC SERPL CALC-MCNC: 32 MG/DL (ref 5–40)

## 2018-05-23 RX ORDER — METFORMIN HYDROCHLORIDE 500 MG/1
TABLET ORAL
Qty: 60 TAB | Refills: 2 | Status: SHIPPED | OUTPATIENT
Start: 2018-05-23 | End: 2019-05-27 | Stop reason: SDUPTHER

## 2018-05-24 NOTE — PROGRESS NOTES
Patient: Natasha Mcelroy MRN: [id number]  SSN: [id number]    YOB: 1985  Age: 29 y.o. Sex: male        Subjective:     Chief Complaint   Patient presents with    Diabetes       HPI: he is a 29y.o. year old male who presents for follow up of chronic health conditions. Patient with hx of DM2, HTN and obesity. . Patient has not followed up as advised. Last seen in office 9 months ago. He is due for lab work. Patient denies HA, dizziness, SOB, CP, abdominal pain, dysuria, myalgias or arthralgias. H    Lab Results   Component Value Date/Time    Hemoglobin A1c 6.8 (H) 05/11/2018 04:36 PM    Hemoglobin A1c (POC) 6.6 08/11/2017 02:12 PM        BP Readings from Last 3 Encounters:   05/11/18 136/83   08/21/17 127/85   08/15/17 122/89     Wt Readings from Last 3 Encounters:   05/11/18 263 lb (119.3 kg)   08/21/17 255 lb 9.6 oz (115.9 kg)   08/11/17 259 lb 12.8 oz (117.8 kg)     Body mass index is 34.7 kg/(m^2). Encounter Diagnoses   Name Primary?     Controlled type 2 diabetes mellitus without complication, without long-term current use of insulin ([facility] [address] Yes    Essential hypertension     Class 2 severe obesity due to excess calories with serious comorbidity and body mass index (BMI) of 35.0 to 35.9 in adult [facility])        Lab Results   Component Value Date/Time    WBC 7.4 08/15/2017 04:04 AM    Hemoglobin (POC) 13.9 02/28/2011 11:02 AM    HGB 15.1 05/11/2018 04:36 PM    Hematocrit (POC) 41 02/28/2011 11:02 AM    HCT 43.6 05/11/2018 04:36 PM    PLATELET 540 88/54/3710 04:04 AM    MCV 87.3 08/15/2017 04:04 AM       Lab Results   Component Value Date/Time    Cholesterol, total 175 05/11/2018 04:36 PM    HDL Cholesterol 33 (L) 05/11/2018 04:36 PM    LDL, calculated 110 (H) 05/11/2018 04:36 PM    Triglyceride 162 (H) 05/11/2018 04:36 PM    CHOL/HDL Ratio 8.0 (H) 10/05/2016 11:56 PM       Lab Results   Component Value Date/Time    TSH 2.960 05/11/2018 04:36 PM      Lab Results   Component Value Date/Time Hemoglobin A1c 6.8 (H) 05/11/2018 04:36 PM    Hemoglobin A1c (POC) 6.6 08/11/2017 02:12 PM        Current and past medical information:    Current Medications after this visit[de-identified]     Current Outpatient Prescriptions   Medication Sig    metFORMIN (GLUCOPHAGE) 500 mg tablet TAKE ONE TABLET BY MOUTH TWICE DAILY WITH MEALS    ibuprofen 100 mg tablet Take 200 mg by mouth as needed for Pain.  glucose blood VI test strips (ONETOUCH VERIO) strip Use twice daily.  Lancets (ONE TOUCH DELICA) misc Use twice daily. No current facility-administered medications for this visit. Patient Active Problem List    Diagnosis Date Noted    Facial abscess 08/12/2017    Left-sided face pain 08/12/2017    Left facial swelling 08/12/2017    Controlled type 2 diabetes mellitus without complication, without long-term current use of insulin ([facility] [address] 08/12/2017    Ketonuria 10/06/2016    Hyperglycemia 10/05/2016    Rib pain 02/24/2014    Erectile dysfunction 02/24/2014    HTN (hypertension) 11/07/2013    ADD (attention deficit disorder) 11/07/2013       Past Medical History:   Diagnosis Date    Diabetes ([address]     HTN (hypertension) 11/7/2013       No Known Allergies    History reviewed. No pertinent surgical history.     Social History     Social History    Marital status:      Spouse name: N/A    Number of children: N/A    Years of education: N/A     Social History Main Topics    Smoking status: Never Smoker    Smokeless tobacco: Never Used    Alcohol use No    Drug use: No    Sexual activity: Not Asked     Other Topics Concern    None     Social History Narrative         Objective:     Review of Systems:  Constitutional: Negative for fatigue or malaise  Derm: Negative for rash or lesion  HEENT: Negative for acute hearing or vision changes  Cardiovascular: Negative for dizziness, chest pain or palpitations  Respiratory: Negative for cough, wheezing or SOB  Gastreintestinal: Negative for nausea or abdominal pain  Genital/urinary: Negative for dysuria or voiding dysfunction  Muscoloskeletal: Negative for acute myalgias or arthralgias   Neurological: Negative for headache, weakness or paresthesia  Psychological: Negative for depression or anxiety      Vitals:    05/11/18 1545   BP: 136/83   Pulse: 84   Resp: 18   Temp: 97 °F (36.1 °C)   TempSrc: Oral   SpO2: 98%   Weight: 263 lb (119.3 kg)   Height: 6' 1\" (1.854 m)      Body mass index is 34.7 kg/(m^2). Physical Exam:  Constitutional: well developed, well nourished, in no acute distress  Skin: warm and dry, normal tone and turgor  Head: normocephalic, atraumatic  Eyes: sclera clear, EOMI  Neck: normal range of motion  Cardiovascular: normal S1, S2, regular rate and rhythm  Respiratory: clear to auscultation bilaterally with symmetrical effort  Abdomen: soft, BS normal  Extremities: full range of motion  Neurology: normal strength and sensation  Psych: active, alert and oriented, affect appropriate     Health Maintenance Due   Topic Date Due    FOOT EXAM Q1  03/12/1996    MICROALBUMIN Q1  03/12/1996    EYE EXAM RETINAL OR DILATED Q1  03/12/1996    Pneumococcal 19-64 Medium Risk (1 of 1 - PPSV23) 03/12/2005     Risk, benefits and potential costs of recommended health maintenance discussed. Patient expressed understanding and deferred at this time. Assessment and orders:       ICD-10-CM ICD-9-CM    1. Controlled type 2 diabetes mellitus without complication, without long-term current use of insulin ([facility]) E11.9 250.00 TSH 3RD GENERATION      HEMOGLOBIN A1C WITH EAG      METABOLIC PANEL, COMPREHENSIVE      HGB & HCT      LIPID PANEL      AMB POC URINALYSIS DIP STICK AUTO W/O MICRO   2. Essential hypertension I10 401.9    3. Class 2 severe obesity due to excess calories with serious comorbidity and body mass index (BMI) of 35.0 to 35.9 in adult [facility]) E66.01 278.01     Z68.35 V85.35          Plan of care:  Diagnoses were discussed in detail with patient. Medication risks/benefits/side effects discussed with patient. Importance of compliance with all prescribed medications discussed. All of the patient's questions were addressed. The patient understands and agrees with our plan of care. The patient knows to call back if they are unsure of or forgets any changes we discussed today or if the symptoms change. The patient received an After-Visit Summary which contains VS, diagnoses, orders, allergy and medication lists. Over half of the 25 minutes face to face with Karen Wu consisted of counseling and discussing treatment plans in reference to his DM2, HTN and weight loss. Patient Care Team:  Rina Hall MD as PCP - General (Family Practice)  Bisi Norwood RN as Ambulatory Care Navigator    Follow-up Disposition:  Return in about 4 months (around 9/11/2018), or if symptoms worsen or fail to improve. No future appointments.     Signed By: Rina Hall MD     May 23, 2018

## 2018-05-24 NOTE — PATIENT INSTRUCTIONS

## 2019-12-06 NOTE — LETTER
12/9/2019 1:54 PM 
 
Mr. Sheron Moralez [address] [address] Dear Mr. Kramer: 
 
We've missed you! Please call our office at [phone] and schedule a follow up appointment for your continued care. Please call to schedule an appointment so we may continue to refill your medications. Thanks! Sincerely, Silvana Pryor MD

## 2019-12-08 RX ORDER — METFORMIN HYDROCHLORIDE 500 MG/1
TABLET ORAL
Qty: 30 TAB | Refills: 0 | Status: SHIPPED | OUTPATIENT
Start: 2019-12-08 | End: 2020-01-23 | Stop reason: DRUGHIGH

## 2019-12-09 NOTE — TELEPHONE ENCOUNTER
Past due for follow up appointment. Needs this and lab work. 30 tabs sent to pharmacy.
20-Jan-2019 06:24

## 2020-01-21 ENCOUNTER — OFFICE VISIT (OUTPATIENT)
Dept: FAMILY MEDICINE CLINIC | Age: 35
End: 2020-01-21

## 2020-01-21 ENCOUNTER — HOSPITAL ENCOUNTER (OUTPATIENT)
Dept: LAB | Age: 35
Discharge: HOME | End: 2020-01-21

## 2020-01-21 VITALS
HEART RATE: 71 BPM | TEMPERATURE: 98.2 F | DIASTOLIC BLOOD PRESSURE: 97 MMHG | BODY MASS INDEX: 34.19 KG/M2 | OXYGEN SATURATION: 96 % | HEIGHT: 73 IN | SYSTOLIC BLOOD PRESSURE: 137 MMHG | WEIGHT: 258 LBS | RESPIRATION RATE: 16 BRPM

## 2020-01-21 DIAGNOSIS — E11.9 TYPE 2 DIABETES MELLITUS WITHOUT COMPLICATION, WITHOUT LONG-TERM CURRENT USE OF INSULIN (HCC): ICD-10-CM

## 2020-01-21 DIAGNOSIS — M62.830 MUSCLE SPASM OF BACK: ICD-10-CM

## 2020-01-21 DIAGNOSIS — I10 ESSENTIAL HYPERTENSION: Primary | ICD-10-CM

## 2020-01-21 DIAGNOSIS — E66.01 CLASS 2 SEVERE OBESITY DUE TO EXCESS CALORIES WITH SERIOUS COMORBIDITY AND BODY MASS INDEX (BMI) OF 35.0 TO 35.9 IN ADULT (HCC): ICD-10-CM

## 2020-01-21 DIAGNOSIS — I10 ESSENTIAL HYPERTENSION: ICD-10-CM

## 2020-01-21 LAB
ALBUMIN SERPL-MCNC: 4.6 G/DL (ref 3.5–5)
ALBUMIN/GLOB SERPL: 1.3 {RATIO} (ref 1.1–2.2)
ALP SERPL-CCNC: 74 U/L (ref 45–117)
ALT SERPL-CCNC: 81 U/L (ref 12–78)
ANION GAP SERPL CALC-SCNC: 4 MMOL/L (ref 5–15)
AST SERPL-CCNC: 27 U/L (ref 15–37)
BASOPHILS # BLD: 0 K/UL (ref 0–0.1)
BASOPHILS NFR BLD: 1 % (ref 0–1)
BILIRUB SERPL-MCNC: 0.8 MG/DL (ref 0.2–1)
BUN SERPL-MCNC: 17 MG/DL (ref 6–20)
BUN/CREAT SERPL: 20 (ref 12–20)
CALCIUM SERPL-MCNC: 9.8 MG/DL (ref 8.5–10.1)
CHLORIDE SERPL-SCNC: 102 MMOL/L (ref 97–108)
CHOLEST SERPL-MCNC: 215 MG/DL
CO2 SERPL-SCNC: 31 MMOL/L (ref 21–32)
CREAT SERPL-MCNC: 0.87 MG/DL (ref 0.7–1.3)
CREAT UR-MCNC: 188 MG/DL
DIFFERENTIAL METHOD BLD: NORMAL
EOSINOPHIL # BLD: 0.1 K/UL (ref 0–0.4)
EOSINOPHIL NFR BLD: 2 % (ref 0–7)
ERYTHROCYTE [DISTWIDTH] IN BLOOD BY AUTOMATED COUNT: 11.8 % (ref 11.5–14.5)
EST. AVERAGE GLUCOSE BLD GHB EST-MCNC: 214 MG/DL
GLOBULIN SER CALC-MCNC: 3.5 G/DL (ref 2–4)
GLUCOSE SERPL-MCNC: 220 MG/DL (ref 65–100)
HBA1C MFR BLD: 9.1 % (ref 4–5.6)
HCT VFR BLD AUTO: 48.1 % (ref 36.6–50.3)
HDLC SERPL-MCNC: 40 MG/DL
HDLC SERPL: 5.4 {RATIO} (ref 0–5)
HGB BLD-MCNC: 16.4 G/DL (ref 12.1–17)
IMM GRANULOCYTES # BLD AUTO: 0 K/UL (ref 0–0.04)
IMM GRANULOCYTES NFR BLD AUTO: 0 % (ref 0–0.5)
LDLC SERPL CALC-MCNC: 143.4 MG/DL (ref 0–100)
LIPID PROFILE,FLP: ABNORMAL
LYMPHOCYTES # BLD: 2.3 K/UL (ref 0.8–3.5)
LYMPHOCYTES NFR BLD: 33 % (ref 12–49)
MCH RBC QN AUTO: 31.2 PG (ref 26–34)
MCHC RBC AUTO-ENTMCNC: 34.1 G/DL (ref 30–36.5)
MCV RBC AUTO: 91.4 FL (ref 80–99)
MICROALBUMIN UR-MCNC: 4.39 MG/DL
MICROALBUMIN/CREAT UR-RTO: 23 MG/G (ref 0–30)
MONOCYTES # BLD: 0.3 K/UL (ref 0–1)
MONOCYTES NFR BLD: 5 % (ref 5–13)
NEUTS SEG # BLD: 4.1 K/UL (ref 1.8–8)
NEUTS SEG NFR BLD: 59 % (ref 32–75)
NRBC # BLD: 0 K/UL (ref 0–0.01)
NRBC BLD-RTO: 0 PER 100 WBC
PLATELET # BLD AUTO: 278 K/UL (ref 150–400)
PMV BLD AUTO: 11.3 FL (ref 8.9–12.9)
POTASSIUM SERPL-SCNC: 4.4 MMOL/L (ref 3.5–5.1)
PROT SERPL-MCNC: 8.1 G/DL (ref 6.4–8.2)
RBC # BLD AUTO: 5.26 M/UL (ref 4.1–5.7)
SODIUM SERPL-SCNC: 137 MMOL/L (ref 136–145)
TRIGL SERPL-MCNC: 158 MG/DL (ref ?–150)
VLDLC SERPL CALC-MCNC: 31.6 MG/DL
WBC # BLD AUTO: 6.9 K/UL (ref 4.1–11.1)

## 2020-01-21 RX ORDER — HYDROCHLOROTHIAZIDE 25 MG/1
25 TABLET ORAL DAILY
Status: CANCELLED | OUTPATIENT
Start: 2020-01-21

## 2020-01-21 RX ORDER — CYCLOBENZAPRINE HCL 5 MG
5 TABLET ORAL
Qty: 30 TAB | Refills: 0 | Status: SHIPPED | OUTPATIENT
Start: 2020-01-21 | End: 2020-03-31

## 2020-01-21 NOTE — PATIENT INSTRUCTIONS
Follow-up in 3 months. Your doctor has recommended that you have an eye exam done. You can contact one of the below offices to schedule your own appointment. Once you have the visit, please ask their office to send us a copy for your record here. [address] [place] [address]                     [phone] Dr. Fred Joe                          [phone] Dr. Peyton Yao                           [phone] [place] [address] [place] [address]                               [phone] [address] [facility]             [phone] [place]  Affiliated with [address] [place] [address] [address] 
[phone] Monitor blood pressure outside the office several times weekly at different times during the day and evening. Bring the record to me in 3 weeks for review. Blood Pressure Record Patient Name:  ______________________ :  ______________________ Date/Time BP Reading Pulse Home Blood Pressure Test: About This Test 
What is it? A home blood pressure test allows you to keep track of your blood pressure at home. Blood pressure is a measure of the force of blood against the walls of your arteries. Blood pressure readings include two numbers, such as 130/80 (say \"130 over 80\"). The first number is the systolic pressure. The second number is the diastolic pressure. Why is this test done? You may do this test at home to: · Find out if you have high blood pressure. · Track your blood pressure if you have high blood pressure. · Track how well medicine is working to reduce high blood pressure. · Check how lifestyle changes, such as weight loss and exercise, are affecting blood pressure.  
How can you prepare for the test? 
 · Do not use caffeine, tobacco, or medicines known to raise blood pressure (such as nasal decongestant sprays) for at least 30 minutes before taking your blood pressure. · Do not exercise for at least 30 minutes before taking your blood pressure. What happens before the test? 
Take your blood pressure while you feel comfortable and relaxed. Sit quietly with both feet on the floor for at least 5 minutes before the test. 
What happens during the test? 
· Sit with your arm slightly bent and resting on a table so that your upper arm is at the same level as your heart. · Roll up your sleeve or take off your shirt to expose your upper arm. · Wrap the blood pressure cuff around your upper arm so that the lower edge of the cuff is about 1 inch above the bend of your elbow. Proceed with the following steps depending on if you are using an automatic or manual pressure monitor. Automatic blood pressure monitors · Press the on/off button on the automatic monitor and wait until the ready-to-measure \"heart\" symbol appears next to zero in the display window. · Press the start button. The cuff will inflate and deflate by itself. · Your blood pressure numbers will appear on the screen. · Write your numbers in your log book, along with the date and time. Manual blood pressure monitors · Place the earpieces of a stethoscope in your ears, and place the bell of the stethoscope over the artery, just below the cuff. · Close the valve on the rubber inflating bulb. · Squeeze the bulb rapidly with your opposite hand to inflate the cuff until the dial or column of mercury reads about 30 mm Hg higher than your usual systolic pressure. If you do not know your usual pressure, inflate the cuff to 210 mm Hg or until the pulse at your wrist disappears. · Open the pressure valve just slightly by twisting or pressing the valve on the bulb.  
· As you watch the pressure slowly fall, note the level on the dial at which you first start to hear a pulsing or tapping sound through the stethoscope. This is your systolic blood pressure. · Continue letting the air out slowly. The sounds will become muffled and will finally disappear. Note the pressure when the sounds completely disappear. This is your diastolic blood pressure. Let out all the remaining air. · Write your numbers in your log book, along with the date and time. What else should you know about the test? 
It is more accurate to take the average of several readings made throughout the day than to rely on a single reading. It's normal for blood pressure to go up and down throughout the day. Follow-up care is a key part of your treatment and safety. Be sure to make and go to all appointments, and call your doctor if you are having problems. It's also a good idea to keep a list of the medicines you take. Where can you learn more? Go to http://jaswant-oneal.info/. Enter C427 in the search box to learn more about \"Home Blood Pressure Test: About This Test.\" Current as of: April 9, 2019 Content Version: 12.2 © 5880-6674 [employer]. Care instructions adapted under license by [employer] (which disclaims liability or warranty for this information). If you have questions about a medical condition or this instruction, always ask your healthcare professional. [address] any warranty or liability for your use of this information.

## 2020-01-21 NOTE — PROGRESS NOTES
1. Have you been to the ER, urgent care clinic since your last visit? Hospitalized since your last visit? No    2. Have you seen or consulted any other health care providers outside of the [address] [place] [address] since your last visit? Include any pap smears or colon screening.  No  Reviewed record in preparation for visit and have necessary documentation  Pt did not bring medication to office visit for review    Goals that were addressed and/or need to be completed during or after this appointment include   Health Maintenance Due   Topic Date Due    Pneumococcal 0-64 years (1 of 1 - PPSV23) 03/12/1992    FOOT EXAM Q1  03/12/1996    MICROALBUMIN Q1  03/12/1996    EYE EXAM RETINAL OR DILATED  03/12/1996    HEMOGLOBIN A1C Q6M  11/11/2018    LIPID PANEL Q1  05/11/2019    Influenza Age 9 to Adult  08/01/2019

## 2020-01-21 NOTE — PROGRESS NOTES
Subjective  CC: Erasto Greene is an 36 y.o. male presents for follow-up for chronic conditions    Diabetes Follow up: Currently taking metformin 500mg BID. Overall the patient feels well with good energy level. Has not been following a diabetic diet and will sometimes forget to take his metformin. Lab Results   Component Value Date/Time    Hemoglobin A1c 6.8 (H) 05/11/2018 04:36 PM    Hemoglobin A1c 7.0 (H) 08/12/2017 01:11 AM    Hemoglobin A1c 11.0 (H) 10/05/2016 07:32 PM       Hypertension Follow up:  Currently is not currently taking medications  The patient reports:  not taking medications currently, no medication side effects noted, home BP monitoring in range of \"high\" when he takes it a [address] No vision changes, no chest pain on exertion, no dyspnea on exertion, no swelling of ankles. 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 States that he has been having right sided \"tightness\" for \"quite some time\". Nothing makes it better. Notices it more when he lays on right side. No blood in urine or burning with urination. Allergies - reviewed:   No Known Allergies      Medications - reviewed:   Current Outpatient Medications   Medication Sig    cyclobenzaprine (FLEXERIL) 5 mg tablet Take 1 Tab by mouth three (3) times daily as needed for Muscle Spasm(s).  metFORMIN (GLUCOPHAGE) 500 mg tablet TAKE 1 TABLET BY MOUTH TWICE DAILY WITH MEALS    ibuprofen 100 mg tablet Take 200 mg by mouth as needed for Pain.  glucose blood VI test strips (ONETOUCH VERIO) strip Use twice daily.  Lancets (ONE TOUCH DELICA) misc Use twice daily. No current facility-administered medications for this visit.           Past Medical History - reviewed:  Past Medical History:   Diagnosis Date    Diabetes ([address]     HTN (hypertension) 11/7/2013         Immunizations - reviewed:   Immunization History Administered Date(s) Administered    Tdap 10/06/2016         ROS  Review of Systems : A review of systems was performed. All pertinent negatives and positives are mentioned in the HPI. Physical Exam  Visit Vitals  BP (!) 137/97 (BP 1 Location: Left arm, BP Patient Position: Sitting)   Pulse 71   Temp 98.2 °F (36.8 °C) (Oral)   Resp 16   Ht 6' 1\" (1.854 m)   Wt 258 lb (117 kg)   SpO2 96%   BMI 34.04 kg/m²       General appearance - Alert, NAD. Head: Atraumatic. Normocephalic. No lymphadenopathy  Eyes: EOMI. Sclera white. Ears: Hearing grossly normal. TM non erythematous with no effusion   Nose: Nares patent, no polyps  Neck: No cervical lymphadenopathy or goiter present  Throat: pharynx clear, no exudates. Respiratory - LCTAB. No wheeze/rale/rhonchi  Heart - Normal rate, regular rhythm. No m/r/r  Abdomen - Soft, non tender. Non distended. Neurological - No focal deficits. Speech normal.   Musculoskeletal - Normal ROM, Gait normal.  Paraspinal muscle spasm present on right side. Extremities - No LE edema. Distal pulses intact  Skin - normal coloration and normal turgor. No cyanosis, no rash. Diabetic foot exam, including monofilament sensation, and vibratory sensation and pulses are normal. No fungal toenail infection, corns or calluses. Assessment/Plan  1. Essential hypertension - BP not at goal today or on retake. Discussed with patient two medication therapies for treatment of his HTN. Best decision can be made pending lab results, specifically, microalbumin. Will either restart HCTZ since pt tolerated this well in the past or start ACEI/ARB if presence of microalbuminuria. - CBC WITH AUTOMATED DIFF; Future  - METABOLIC PANEL, COMPREHENSIVE; Future    2. Class 2 severe obesity due to excess calories with serious comorbidity and body mass index (BMI) of 35.0 to 35.9 in adult [facility])  - LIPID PANEL; Future    3.  Type 2 diabetes mellitus without complication, without long-term current use of insulin ([address] - will recheck A1c today. Normal foot exam. Will send in refills on metformin pending A1c result. Pt may need increase in dose.  - HEMOGLOBIN A1C WITH EAG; Future  - MICROALBUMIN, UR, RAND W/ MICROALB/CREAT RATIO; Future  -  DIABETES FOOT EXAM    4. Muscle spasm of back - discussed heat therapy to help with muscle spasm. Discussed avoidance of operating heavy machinery or driving with medication until able to see how sedating, if at all, medication is for patient. - cyclobenzaprine (FLEXERIL) 5 mg tablet; Take 1 Tab by mouth three (3) times daily as needed for Muscle Spasm(s). Dispense: 30 Tab; Refill: 0    I have discussed the aforementioned diagnoses and plan with the patient in detail. I have provided information in person and/or in AVS. All questions answered prior to discharge.     Rani Watts MD  Family Medicine Resident

## 2020-01-23 ENCOUNTER — TELEPHONE (OUTPATIENT)
Dept: FAMILY MEDICINE CLINIC | Age: 35
End: 2020-01-23

## 2020-01-23 DIAGNOSIS — E11.9 TYPE 2 DIABETES MELLITUS WITHOUT COMPLICATION, WITHOUT LONG-TERM CURRENT USE OF INSULIN (HCC): Primary | ICD-10-CM

## 2020-01-23 DIAGNOSIS — I10 ESSENTIAL HYPERTENSION: ICD-10-CM

## 2020-01-23 DIAGNOSIS — E78.2 MIXED HYPERLIPIDEMIA: ICD-10-CM

## 2020-01-23 RX ORDER — ATORVASTATIN CALCIUM 10 MG/1
10 TABLET, FILM COATED ORAL DAILY
Qty: 90 TAB | Refills: 1 | Status: SHIPPED | OUTPATIENT
Start: 2020-01-23 | End: 2020-12-23 | Stop reason: SDUPTHER

## 2020-01-23 RX ORDER — METFORMIN HYDROCHLORIDE 1000 MG/1
1000 TABLET ORAL 2 TIMES DAILY WITH MEALS
Qty: 180 TAB | Refills: 1 | Status: SHIPPED | OUTPATIENT
Start: 2020-01-23 | End: 2020-12-23 | Stop reason: SDUPTHER

## 2020-01-23 RX ORDER — AMLODIPINE BESYLATE 5 MG/1
5 TABLET ORAL DAILY
Qty: 90 TAB | Refills: 1 | Status: SHIPPED | OUTPATIENT
Start: 2020-01-23 | End: 2020-12-23 | Stop reason: SDUPTHER

## 2020-01-23 NOTE — TELEPHONE ENCOUNTER
Attempted to call patient to discuss lab results. There was no answer. Left general voicemail message. Need to follow-up with decision for HTN, cholesterol, and diabetes management. Letter has been sent but need to inform patient:    Your labs results show that your diabetes is not in good control. Your A1c was 9.1. We need to increase your metformin to 1000mg twice a day. A new prescription have been sent to your pharmacy. Start with 1000mg in the morning and 500mg at night for 5 days then increase to 1000mg twice a day. Your kidneys do not show signs of injury from your diabetes. This is good news. We will start a medication called amlodipine to treat your blood pressure. Take 5mg daily and return in 2 weeks for a recheck blood pressure. Finally, due to your cholesterol being on the higher side in the setting of hypertension and diabetes, it would be good to start a low dose cholesterol medication to decrease your risk of heart attack and stroke in the future. Your LDL, or bad cholesterol, was 143 which is borderline high. Your goal is <100. So will start Atorvastatin 10mg nightly. Normal liver, kidney, and blood counts.

## 2020-01-24 ENCOUNTER — TELEPHONE (OUTPATIENT)
Dept: FAMILY MEDICINE CLINIC | Age: 35
End: 2020-01-24

## 2020-01-24 NOTE — TELEPHONE ENCOUNTER
----- Message from Rosana Bernheim sent at 1/24/2020 10:07 AM EST -----  Regarding: [place]/telephone  Pts wife Linda Solano is returning a call from yesterday in regard to his lab results. Wifes number is [phone].

## 2020-02-11 ENCOUNTER — OFFICE VISIT (OUTPATIENT)
Dept: FAMILY MEDICINE CLINIC | Age: 35
End: 2020-02-11

## 2020-02-11 VITALS
SYSTOLIC BLOOD PRESSURE: 132 MMHG | HEIGHT: 73 IN | OXYGEN SATURATION: 98 % | DIASTOLIC BLOOD PRESSURE: 88 MMHG | HEART RATE: 83 BPM | RESPIRATION RATE: 16 BRPM | TEMPERATURE: 98.3 F | BODY MASS INDEX: 34.59 KG/M2 | WEIGHT: 261 LBS

## 2020-02-11 DIAGNOSIS — M62.830 MUSCLE SPASM OF BACK: ICD-10-CM

## 2020-02-11 DIAGNOSIS — E11.9 TYPE 2 DIABETES MELLITUS WITHOUT COMPLICATION, WITHOUT LONG-TERM CURRENT USE OF INSULIN (HCC): ICD-10-CM

## 2020-02-11 DIAGNOSIS — I10 ESSENTIAL HYPERTENSION: Primary | ICD-10-CM

## 2020-02-11 NOTE — PATIENT INSTRUCTIONS
Follow-up in 2 months (after 20) for recheck of diabetes. Your doctor has recommended that you have an eye exam done. You can contact one of the below offices to schedule your own appointment. Once you have the visit, please ask their office to send us a copy for your record here. Henok Roa                     [phone]  Dr. Sandeep Huertas                          [phone]  Dr. Alejandro Parra                           [phone]  [place] [address]             [phone]    [facility] [place]  [facility] with [facility]  [address] [place] [address]  [phone]    Monitor blood pressure outside the office several times weekly at different times during the day and evening. Bring the record to me in 3 weeks for review. Blood Pressure Record     Patient Name:  ______________________ :  ______________________    Date/Time BP Reading Pulse                                                                                                                                                                                                Home Blood Pressure Test: About This Test  What is it? A home blood pressure test allows you to keep track of your blood pressure at home. Blood pressure is a measure of the force of blood against the walls of your arteries. Blood pressure readings include two numbers, such as 130/80 (say \"130 over 80\"). The first number is the systolic pressure. The second number is the diastolic pressure. Why is this test done? You may do this test at home to:  · Find out if you have high blood pressure. · Track your blood pressure if you have high blood pressure. · Track how well medicine is working to reduce high blood pressure. · Check how lifestyle changes, such as weight loss and exercise, are affecting blood pressure.   How can you prepare for the test?  · Do not use caffeine, tobacco, or medicines known to raise blood pressure (such as nasal decongestant sprays) for at least 30 minutes before taking your blood pressure. · Do not exercise for at least 30 minutes before taking your blood pressure. What happens before the test?  Take your blood pressure while you feel comfortable and relaxed. Sit quietly with both feet on the floor for at least 5 minutes before the test.  What happens during the test?  · Sit with your arm slightly bent and resting on a table so that your upper arm is at the same level as your heart. · Roll up your sleeve or take off your shirt to expose your upper arm. · Wrap the blood pressure cuff around your upper arm so that the lower edge of the cuff is about 1 inch above the bend of your elbow. Proceed with the following steps depending on if you are using an automatic or manual pressure monitor. Automatic blood pressure monitors  · Press the on/off button on the automatic monitor and wait until the ready-to-measure \"heart\" symbol appears next to zero in the display window. · Press the start button. The cuff will inflate and deflate by itself. · Your blood pressure numbers will appear on the screen. · Write your numbers in your log book, along with the date and time. Manual blood pressure monitors  · Place the earpieces of a stethoscope in your ears, and place the bell of the stethoscope over the artery, just below the cuff. · Close the valve on the rubber inflating bulb. · Squeeze the bulb rapidly with your opposite hand to inflate the cuff until the dial or column of mercury reads about 30 mm Hg higher than your usual systolic pressure. If you do not know your usual pressure, inflate the cuff to 210 mm Hg or until the pulse at your wrist disappears. · Open the pressure valve just slightly by twisting or pressing the valve on the bulb.   · As you watch the pressure slowly fall, note the level on the dial at which you first start to hear a pulsing or tapping sound through the stethoscope. This is your systolic blood pressure. · Continue letting the air out slowly. The sounds will become muffled and will finally disappear. Note the pressure when the sounds completely disappear. This is your diastolic blood pressure. Let out all the remaining air. · Write your numbers in your log book, along with the date and time. What else should you know about the test?  It is more accurate to take the average of several readings made throughout the day than to rely on a single reading. It's normal for blood pressure to go up and down throughout the day. Follow-up care is a key part of your treatment and safety. Be sure to make and go to all appointments, and call your doctor if you are having problems. It's also a good idea to keep a list of the medicines you take. Where can you learn more? Go to http://jaswant-oneal.info/. Enter C427 in the search box to learn more about \"Home Blood Pressure Test: About This Test.\"  Current as of: April 9, 2019  Content Version: 12.2  © 5588-9741 [employer]. Care instructions adapted under license by [employer] (which disclaims liability or warranty for this information). If you have questions about a medical condition or this instruction, always ask your healthcare professional. [address] any warranty or liability for your use of this information.

## 2020-02-11 NOTE — PROGRESS NOTES
Subjective  CC: Charlie Mistry is an 36 y.o. male presents for follow-up for chronic conditions    Diabetes Follow up: Currently taking metformin 1000mg BID. Dose was increased at last visit due to elevated in A1c. Lab Results   Component Value Date/Time    Hemoglobin A1c 9.1 (H) 01/21/2020 03:51 PM    Hemoglobin A1c 6.8 (H) 05/11/2018 04:36 PM    Hemoglobin A1c 7.0 (H) 08/12/2017 01:11 AM       Hypertension Follow up:  Pt was started on amlodipine 5 for blood pressure. He comes in today for follow-up. He has not been taking his BP at home. No vision changes, no chest pain on exertion, no dyspnea on exertion, no swelling of ankles. BP Readings from Last 3 Encounters:   02/11/20 132/88   01/21/20 (!) 137/97   05/11/18 136/83     Right side tightness  Pt was started on Flexeril for back spasm at last visit. Today he states that it is better. Allergies - reviewed:   No Known Allergies      Medications - reviewed:   Current Outpatient Medications   Medication Sig    amLODIPine (NORVASC) 5 mg tablet Take 1 Tab by mouth daily.  metFORMIN (GLUCOPHAGE) 1,000 mg tablet Take 1 Tab by mouth two (2) times daily (with meals).  atorvastatin (LIPITOR) 10 mg tablet Take 1 Tab by mouth daily.  cyclobenzaprine (FLEXERIL) 5 mg tablet Take 1 Tab by mouth three (3) times daily as needed for Muscle Spasm(s).  ibuprofen 100 mg tablet Take 200 mg by mouth as needed for Pain.  glucose blood VI test strips (ONETOUCH VERIO) strip Use twice daily.  Lancets (ONE TOUCH DELICA) misc Use twice daily. No current facility-administered medications for this visit. Past Medical History - reviewed:  Past Medical History:   Diagnosis Date    Diabetes ([address]     HTN (hypertension) 11/7/2013         Immunizations - reviewed:   Immunization History   Administered Date(s) Administered    Tdap 10/06/2016         ROS  Review of Systems : A review of systems was performed.  All pertinent negatives and positives are mentioned in the HPI. Physical Exam  Visit Vitals  /88 (BP 1 Location: Left arm, BP Patient Position: Sitting)   Pulse 83   Temp 98.3 °F (36.8 °C) (Oral)   Resp 16   Ht 6' 1\" (1.854 m)   Wt 261 lb (118.4 kg)   SpO2 98%   BMI 34.43 kg/m²       General appearance - Alert, NAD. Head: Atraumatic. Normocephalic. No lymphadenopathy  Eyes: EOMI. Sclera white. Respiratory - LCTAB. No wheeze/rale/rhonchi  Heart - Normal rate, regular rhythm. No m/r/r  Abdomen - Soft, non tender. Non distended. Neurological - No focal deficits. Speech normal.   Extremities - No LE edema. Distal pulses intact  Skin - normal coloration and normal turgor. No cyanosis, no rash. Assessment/Plan  1. Essential hypertension - BP improved today. Will continue to monitor on current dose    2. Type 2 diabetes mellitus without complication, without long-term current use of insulin ([facility] -   - RTC around 4/21/20 for recheck A1c    3. Muscle spasm of back - improved. I have discussed the aforementioned diagnoses and plan with the patient in detail. I have provided information in person and/or in AVS. All questions answered prior to discharge.     Lori Henley MD  Family Medicine Resident

## 2020-03-31 DIAGNOSIS — M62.830 MUSCLE SPASM OF BACK: ICD-10-CM

## 2020-03-31 RX ORDER — CYCLOBENZAPRINE HCL 5 MG
TABLET ORAL
Qty: 30 TAB | Refills: 0 | Status: SHIPPED | OUTPATIENT
Start: 2020-03-31 | End: 2020-06-04 | Stop reason: SDUPTHER

## 2020-06-04 DIAGNOSIS — M62.830 MUSCLE SPASM OF BACK: ICD-10-CM

## 2020-06-04 RX ORDER — CYCLOBENZAPRINE HCL 5 MG
5 TABLET ORAL
Qty: 30 TAB | Refills: 0 | Status: SHIPPED | OUTPATIENT
Start: 2020-06-04 | End: 2020-12-23 | Stop reason: SDUPTHER

## 2020-12-23 ENCOUNTER — VIRTUAL VISIT (OUTPATIENT)
Dept: FAMILY MEDICINE CLINIC | Age: 35
End: 2020-12-23
Payer: COMMERCIAL

## 2020-12-23 DIAGNOSIS — E11.9 TYPE 2 DIABETES MELLITUS WITHOUT COMPLICATION, WITHOUT LONG-TERM CURRENT USE OF INSULIN (HCC): Primary | ICD-10-CM

## 2020-12-23 DIAGNOSIS — M62.830 MUSCLE SPASM OF BACK: ICD-10-CM

## 2020-12-23 DIAGNOSIS — I10 ESSENTIAL HYPERTENSION: ICD-10-CM

## 2020-12-23 DIAGNOSIS — E78.2 MIXED HYPERLIPIDEMIA: ICD-10-CM

## 2020-12-23 PROCEDURE — 99214 OFFICE O/P EST MOD 30 MIN: CPT | Performed by: FAMILY MEDICINE

## 2020-12-23 RX ORDER — METFORMIN HYDROCHLORIDE 1000 MG/1
1000 TABLET ORAL 2 TIMES DAILY WITH MEALS
Qty: 180 TAB | Refills: 0 | Status: SHIPPED | OUTPATIENT
Start: 2020-12-23 | End: 2020-12-23 | Stop reason: ALTCHOICE

## 2020-12-23 RX ORDER — ATORVASTATIN CALCIUM 10 MG/1
10 TABLET, FILM COATED ORAL DAILY
Qty: 90 TAB | Refills: 0 | Status: SHIPPED | OUTPATIENT
Start: 2020-12-23 | End: 2021-03-07 | Stop reason: SDUPTHER

## 2020-12-23 RX ORDER — CYCLOBENZAPRINE HCL 5 MG
5 TABLET ORAL
Qty: 30 TAB | Refills: 0 | Status: SHIPPED | OUTPATIENT
Start: 2020-12-23 | End: 2021-03-15

## 2020-12-23 RX ORDER — BLOOD SUGAR DIAGNOSTIC
STRIP MISCELLANEOUS
Qty: 100 STRIP | Refills: 0 | Status: SHIPPED | OUTPATIENT
Start: 2020-12-23 | End: 2021-08-05

## 2020-12-23 RX ORDER — METFORMIN HYDROCHLORIDE 500 MG/1
1000 TABLET, EXTENDED RELEASE ORAL DAILY
Qty: 180 TAB | Refills: 0 | Status: SHIPPED | OUTPATIENT
Start: 2020-12-23 | End: 2021-03-07

## 2020-12-23 RX ORDER — AMLODIPINE BESYLATE 5 MG/1
5 TABLET ORAL DAILY
Qty: 90 TAB | Refills: 0 | Status: SHIPPED | OUTPATIENT
Start: 2020-12-23 | End: 2022-07-26 | Stop reason: SDUPTHER

## 2020-12-23 NOTE — PROGRESS NOTES
1. Have you been to the ER, urgent care clinic since your last visit? Hospitalized since your last visit? No    2. Have you seen or consulted any other health care providers outside of the [address] [place] [address] since your last visit? Include any pap smears or colon screening.  No  Reviewed record in preparation for visit and have necessary documentation  Pt did not bring medication to office visit for review  Information was given to pt on Advanced Directives, Living Will  Information was given on Shingles Vaccine  opportunity was given for questions  Goals that were addressed and/or need to be completed during or after this appointment include   Health Maintenance Due   Topic Date Due    Eye Exam Retinal or Dilated  03/12/1996    A1C test (Diabetic or Prediabetic)  04/21/2020    Flu Vaccine (1) 09/01/2020    Foot Exam Q1  01/21/2021    MICROALBUMIN Q1  01/21/2021

## 2020-12-31 NOTE — PROGRESS NOTES
Gianna Gay is a 30 y.o. male evaluated via computer/telephone on 20. Patient Identity confirmed by . Video encounter done in lieu of office visit due to extraordinary circumstances. A state of national and state emergency has been declared by the President and the [place] due to the [employer] pandemic. Pursuant to this emergency declaration under the [facility] waiver authority and the [employer] Act, this Virtual  Visit was conducted, with patient's consent, to reduce the patient's risk of exposure to COVID-19 and provide continuity of care for an established patient. Natali Tinajero LPN coordinated virtual visit    Consent:  Patient and/or health care decision maker is aware that he/she may receive a bill for this video service, depending on his insurance coverage, and has provided verbal consent to proceed: Yes    Physician Location: Office  Patient Location: Home    CC: diabetes  Information gathered from patient and/or health care decision maker. HPI: Gianna Gay is a 30 y.o. male who was evaluated by synchronous (real-time) audio-video technology from his/her home, through the [facility] Patient Portal.  Patient with hx of T2D, HTN, back spasms and obesity. He is past due for lab work. Patient denies HA, dizziness, SOB, CP, abdominal pain, dysuria, myalgias or arthralgias. Encounter Diagnoses   Name Primary?  Type 2 diabetes mellitus without complication, without long-term current use of insulin (HCC) Yes    Essential hypertension     Mixed hyperlipidemia     Muscle spasm of back        Current Outpatient Medications:     cyclobenzaprine (FLEXERIL) 5 mg tablet, Take 1 Tab by mouth three (3) times daily as needed for Muscle Spasm(s). , Disp: 30 Tab, Rfl: 0    amLODIPine (NORVASC) 5 mg tablet, Take 1 Tab by mouth daily. , Disp: 90 Tab, Rfl: 0    atorvastatin (LIPITOR) 10 mg tablet, Take 1 Tab by mouth daily. , Disp: 90 Tab, Rfl: 0    glucose blood VI test strips (OneTouch Verio test strips) strip, Use twice daily. , Disp: 100 Strip, Rfl: 0    metFORMIN ER (GLUCOPHAGE XR) 500 mg tablet, Take 2 Tabs by mouth daily. , Disp: 180 Tab, Rfl: 0    linaGLIPtin (TRADJENTA) 5 mg tablet, Take 1 Tab by mouth daily. , Disp: 90 Tab, Rfl: 0    ibuprofen 100 mg tablet, Take 200 mg by mouth as needed for Pain., Disp: , Rfl:     Lancets (ONE TOUCH DELICA) misc, Use twice daily. , Disp: 1 Each, Rfl: 1     No Known Allergies     Patient Active Problem List    Diagnosis Date Noted    Facial abscess 08/12/2017    Left-sided face pain 08/12/2017    Left facial swelling 08/12/2017    Controlled type 2 diabetes mellitus without complication, without long-term current use of insulin ([facility] [address] 08/12/2017    Ketonuria 10/06/2016    Hyperglycemia 10/05/2016    Rib pain 02/24/2014    Erectile dysfunction 02/24/2014    HTN (hypertension) 11/07/2013    ADD (attention deficit disorder) 11/07/2013        Review of Systems:  Constitutional: Negative for fatigue, malaise  Resp: Negative for cough, wheezing or SOB  CV: Negative for chest pain, dizziness or palpitations  GI: Negative for nausea or abdominal pain  MS: see HPI  Neuro: Negative for HA, weakness or paresthesia  Psych: Negative for depression or anxiety         Physical Examination:  General: Appears in no acute distress  Head: Normocephalic, atraumatic  Eyes: Sclera clear, EOMI  Neck: Normal range of motion  Respiratory: Symmetrical, unlabored effort  Psych: Active, alert and oriented. Affect appropriate     Assessment/Plan:  Details of this discussion including any medical advice provided: Patient advised as a precaution to stay at home, practice regular hand washing with soap and warm water and to wear a mask and utilize social distancing when necessary to be out in public places. ICD-10-CM ICD-9-CM    1.  Type 2 diabetes mellitus without complication, without long-term current use of insulin (HCC)  E11.9 250.00 LIPID PANEL      TSH 3RD GENERATION      METABOLIC PANEL, COMPREHENSIVE      HEMOGLOBIN A1C WITH EAG      CBC W/O DIFF      DISCONTINUED: metFORMIN (GLUCOPHAGE) 1,000 mg tablet   2. Essential hypertension  I10 401.9 amLODIPine (NORVASC) 5 mg tablet      TSH 3RD GENERATION      METABOLIC PANEL, COMPREHENSIVE   3. Mixed hyperlipidemia  E78.2 272.2 atorvastatin (LIPITOR) 10 mg tablet      LIPID PANEL      METABOLIC PANEL, COMPREHENSIVE   4. Muscle spasm of back  M62.830 724.8 cyclobenzaprine (FLEXERIL) 5 mg tablet     Importance of compliance with all prescribed medications and diabetic diet discussed. Symptomatic therapy suggested: call prn if symptoms persist or worsen. Total Time: minutes: 11-20 minutes was spent addressing above problems and plan. Patient medical history, prior OV notes, vitals flow sheet, lab results, medications, and allergies were reviewed during this encounter. All of the patient's questions were addressed and answered to apparent satisfaction. The patient understands and agrees with our plan of care. The patient knows to call back if they have questions about the plan of care or if symptoms change. No future appointments.           MD KAMI Jeffrey & BUZZ ANN [facility]  12/31/20

## 2021-03-07 DIAGNOSIS — E78.2 MIXED HYPERLIPIDEMIA: ICD-10-CM

## 2021-03-07 DIAGNOSIS — M62.830 MUSCLE SPASM OF BACK: ICD-10-CM

## 2021-03-07 RX ORDER — METFORMIN HYDROCHLORIDE 750 MG/1
750 TABLET, EXTENDED RELEASE ORAL
Qty: 180 TAB | Refills: 1 | Status: SHIPPED | OUTPATIENT
Start: 2021-03-07 | End: 2021-03-15 | Stop reason: SDUPTHER

## 2021-03-07 RX ORDER — ATORVASTATIN CALCIUM 20 MG/1
20 TABLET, FILM COATED ORAL DAILY
Qty: 90 TAB | Refills: 1 | Status: SHIPPED | OUTPATIENT
Start: 2021-03-07 | End: 2021-07-30

## 2021-03-15 ENCOUNTER — VIRTUAL VISIT (OUTPATIENT)
Dept: FAMILY MEDICINE CLINIC | Age: 36
End: 2021-03-15
Payer: COMMERCIAL

## 2021-03-15 DIAGNOSIS — E11.9 TYPE 2 DIABETES MELLITUS WITHOUT COMPLICATION, WITHOUT LONG-TERM CURRENT USE OF INSULIN (HCC): Primary | ICD-10-CM

## 2021-03-15 PROCEDURE — 99443 PR PHYS/QHP TELEPHONE EVALUATION 21-30 MIN: CPT | Performed by: FAMILY MEDICINE

## 2021-03-15 RX ORDER — METFORMIN HYDROCHLORIDE 750 MG/1
TABLET, EXTENDED RELEASE ORAL
Qty: 270 TAB | Refills: 1 | Status: SHIPPED | OUTPATIENT
Start: 2021-03-15 | End: 2021-07-30

## 2021-03-15 RX ORDER — DULAGLUTIDE 0.75 MG/.5ML
0.75 INJECTION, SOLUTION SUBCUTANEOUS
Qty: 4 SYRINGE | Refills: 2 | Status: SHIPPED | OUTPATIENT
Start: 2021-03-15 | End: 2021-06-27

## 2021-03-15 NOTE — PROGRESS NOTES
Jojo Hayden is a 29 y.o. male evaluated via computer/telephone on 03/15/21. Patient Identity confirmed by . Video encounter done in lieu of office visit due to extraordinary circumstances. A state of national and state emergency has been declared by the President and the [place] due to the [employer] pandemic. Pursuant to this emergency declaration under the [facility] waiver authority and the [employer] and [employer] Act, this Virtual  Visit was conducted, with patient's consent, to reduce the patient's risk of exposure to COVID-19 and provide continuity of care for an established patient. Dann Bowen LPN coordinated virtual visit    Consent:  Patient and/or health care decision maker is aware that he/she may receive a bill for this video service, depending on his insurance coverage, and has provided verbal consent to proceed: Yes    Physician Location: Office  Patient Location: Home    CC: uncontrolled diabetes  Information gathered from patient and/or health care decision maker. HPI: Jojo Hayden is a 29 y.o. male who was evaluated by synchronous (real-time) audio-video technology from his/her home, through the [facility] Patient Portal.  Patient with hx of T2D, HTN, back spasms and obesity. Last HgbA1c showed worsening blood glucose. Encounter Diagnoses   Name Primary?  Type 2 diabetes mellitus without complication, without long-term current use of insulin (HCC) Yes       Lab Results   Component Value Date/Time    Hemoglobin A1c 10.0 (H) 2021 11:45 AM    Hemoglobin A1c (POC) 6.6 2017 02:12 PM          Current Outpatient Medications:     metFORMIN ER (GLUCOPHAGE XR) 750 mg tablet, Take one tab in morning and two tabs in evening before., Disp: 270 Tab, Rfl: 1    dulaglutide (Trulicity) 2.01 SH/1.8 mL sub-q pen, 0.5 mL by SubCUTAneous route every seven (7) days. , Disp: 4 Syringe, Rfl: 2    atorvastatin (LIPITOR) 20 mg tablet, Take 1 Tab by mouth daily. , Disp: 90 Tab, Rfl: 1    amLODIPine (NORVASC) 5 mg tablet, Take 1 Tab by mouth daily. , Disp: 90 Tab, Rfl: 0    glucose blood VI test strips (OneTouch Verio test strips) strip, Use twice daily. , Disp: 100 Strip, Rfl: 0    ibuprofen 100 mg tablet, Take 200 mg by mouth as needed for Pain., Disp: , Rfl:     Lancets (ONE TOUCH DELICA) misc, Use twice daily. , Disp: 1 Each, Rfl: 1     No Known Allergies     Patient Active Problem List    Diagnosis Date Noted    Facial abscess 08/12/2017    Left-sided face pain 08/12/2017    Left facial swelling 08/12/2017    Controlled type 2 diabetes mellitus without complication, without long-term current use of insulin [facility] 08/12/2017    Ketonuria 10/06/2016    Hyperglycemia 10/05/2016    Rib pain 02/24/2014    Erectile dysfunction 02/24/2014    HTN (hypertension) 11/07/2013    ADD (attention deficit disorder) 11/07/2013        Review of Systems:  Constitutional: Negative for fatigue, malaise  Resp: Negative for cough, wheezing or SOB  CV: Negative for chest pain, dizziness or palpitations  GI: Negative for nausea or abdominal pain  Neuro: Negative for HA, weakness or paresthesia      Physical Examination:  General: Appears in no acute distress  Head: Normocephalic, atraumatic  Eyes: Sclera clear, EOMI  Neck: Normal range of motion  Respiratory: Symmetrical, unlabored effort  Psych: Active, alert and oriented. Affect appropriate     Assessment/Plan:  Details of this discussion including any medical advice provided: Patient advised as a precaution to stay at home, practice regular hand washing with soap and warm water and to wear a mask and utilize social distancing when necessary to be out in public places. ICD-10-CM ICD-9-CM    1.  Type 2 diabetes mellitus without complication, without long-term current use of insulin ([facility])  E11.9 250.00 metFORMIN ER (GLUCOPHAGE XR) 750 mg tablet      dulaglutide (Trulicity) 9.16 GR/3.6 mL sub-q pen     Importance of compliance with all prescribed medications and diabetic diet discussed. Symptomatic therapy suggested: call prn if symptoms persist or worsen. Total Time: minutes: 21-30 minutes was spent addressing above problems and plan. Patient medical history, prior OV notes, vitals flow sheet, lab results, medications, and allergies were reviewed during this encounter. All of the patient's questions were addressed and answered to apparent satisfaction. The patient understands and agrees with our plan of care. The patient knows to call back if they have questions about the plan of care or if symptoms change. No future appointments. Follow-up and Dispositions    · Return in about 6 weeks (around 4/26/2021), or if symptoms worsen or fail to improve.          MD KAMI Han & BUZZ ANN [facility]  03/15/21

## 2021-03-15 NOTE — PROGRESS NOTES
1. Have you been to the ER, urgent care clinic since your last visit? Hospitalized since your last visit? No    2. Have you seen or consulted any other health care providers outside of the [address] [place] [address] since your last visit? Include any pap smears or colon screening.  No        Health Maintenance Due   Topic Date Due    Pneumococcal 0-64 years (1 of 1 - PPSV23) Never done    Eye Exam Retinal or Dilated  Never done    COVID-19 Vaccine (1) Never done    Foot Exam Q1  01/21/2021    MICROALBUMIN Q1  01/21/2021

## 2021-07-03 DIAGNOSIS — E11.9 TYPE 2 DIABETES MELLITUS WITHOUT COMPLICATION, WITHOUT LONG-TERM CURRENT USE OF INSULIN (HCC): ICD-10-CM

## 2021-07-07 RX ORDER — DULAGLUTIDE 0.75 MG/.5ML
INJECTION, SOLUTION SUBCUTANEOUS
Qty: 4 ML | Refills: 0 | OUTPATIENT
Start: 2021-07-07

## 2021-07-08 NOTE — TELEPHONE ENCOUNTER
Attempted to call. No answer. Message left. Advise patient OV and lab work needs to be scheduled with Dr Sabrina Mcdonald.

## 2021-07-08 NOTE — TELEPHONE ENCOUNTER
Attempted to call. No answer. Message left. Advise patient OV and lab work needs to be scheduled with Dr Indra Dubon.

## 2021-08-05 ENCOUNTER — OFFICE VISIT (OUTPATIENT)
Dept: FAMILY MEDICINE CLINIC | Age: 36
End: 2021-08-05
Payer: COMMERCIAL

## 2021-08-05 VITALS
WEIGHT: 239 LBS | RESPIRATION RATE: 16 BRPM | BODY MASS INDEX: 31.68 KG/M2 | DIASTOLIC BLOOD PRESSURE: 90 MMHG | HEIGHT: 73 IN | HEART RATE: 75 BPM | TEMPERATURE: 98 F | OXYGEN SATURATION: 96 % | SYSTOLIC BLOOD PRESSURE: 128 MMHG

## 2021-08-05 DIAGNOSIS — I10 ESSENTIAL HYPERTENSION: ICD-10-CM

## 2021-08-05 DIAGNOSIS — E11.9 TYPE 2 DIABETES MELLITUS WITHOUT COMPLICATION, WITHOUT LONG-TERM CURRENT USE OF INSULIN (HCC): Primary | ICD-10-CM

## 2021-08-05 DIAGNOSIS — E78.2 MIXED HYPERLIPIDEMIA: ICD-10-CM

## 2021-08-05 PROCEDURE — 99214 OFFICE O/P EST MOD 30 MIN: CPT | Performed by: FAMILY MEDICINE

## 2021-08-05 RX ORDER — CYCLOBENZAPRINE HCL 5 MG
TABLET ORAL
COMMUNITY
Start: 2021-07-29 | End: 2022-07-26

## 2021-08-05 RX ORDER — BUSPIRONE HYDROCHLORIDE 10 MG/1
10 TABLET ORAL 3 TIMES DAILY
Qty: 60 TABLET | Refills: 2 | Status: SHIPPED | OUTPATIENT
Start: 2021-08-05 | End: 2022-07-26

## 2021-08-05 NOTE — PROGRESS NOTES
1. Have you been to the ER, urgent care clinic, or been hospitalized since your last visit? no    2. Have you seen or consulted any other health care providers outside of the [address] [place] [address] since your last visit? no    Reviewed record in preparation for visit and have necessary documentation  Goals that were addressed and/or need to be completed during or after this appointment include   Health Maintenance Due   Topic Date Due    Eye Exam Retinal or Dilated  Never done    COVID-19 Vaccine (1) Never done    Foot Exam Q1  01/21/2021    MICROALBUMIN Q1  01/21/2021    A1C test (Diabetic or Prediabetic)  06/05/2021       I have received verbal consent from Nickolas Horta to discuss any/all medical information while others present in the room.

## 2021-08-06 LAB
ALBUMIN SERPL-MCNC: 4.6 G/DL (ref 3.5–5)
ALBUMIN/GLOB SERPL: 1.5 {RATIO} (ref 1.1–2.2)
ALP SERPL-CCNC: 65 U/L (ref 45–117)
ALT SERPL-CCNC: 41 U/L (ref 12–78)
ANION GAP SERPL CALC-SCNC: 3 MMOL/L (ref 5–15)
AST SERPL-CCNC: 16 U/L (ref 15–37)
BILIRUB SERPL-MCNC: 0.8 MG/DL (ref 0.2–1)
BUN SERPL-MCNC: 15 MG/DL (ref 6–20)
BUN/CREAT SERPL: 21 (ref 12–20)
CALCIUM SERPL-MCNC: 10.4 MG/DL (ref 8.5–10.1)
CHLORIDE SERPL-SCNC: 106 MMOL/L (ref 97–108)
CHOLEST SERPL-MCNC: 155 MG/DL
CO2 SERPL-SCNC: 27 MMOL/L (ref 21–32)
CREAT SERPL-MCNC: 0.7 MG/DL (ref 0.7–1.3)
ERYTHROCYTE [DISTWIDTH] IN BLOOD BY AUTOMATED COUNT: 12.1 % (ref 11.5–14.5)
EST. AVERAGE GLUCOSE BLD GHB EST-MCNC: 128 MG/DL
GLOBULIN SER CALC-MCNC: 3.1 G/DL (ref 2–4)
GLUCOSE SERPL-MCNC: 126 MG/DL (ref 65–100)
HBA1C MFR BLD: 6.1 % (ref 4–5.6)
HCT VFR BLD AUTO: 47.4 % (ref 36.6–50.3)
HDLC SERPL-MCNC: 44 MG/DL
HDLC SERPL: 3.5 {RATIO} (ref 0–5)
HGB BLD-MCNC: 16 G/DL (ref 12.1–17)
LDLC SERPL CALC-MCNC: 91.8 MG/DL (ref 0–100)
MCH RBC QN AUTO: 31.5 PG (ref 26–34)
MCHC RBC AUTO-ENTMCNC: 33.8 G/DL (ref 30–36.5)
MCV RBC AUTO: 93.3 FL (ref 80–99)
NRBC # BLD: 0 K/UL (ref 0–0.01)
NRBC BLD-RTO: 0 PER 100 WBC
PLATELET # BLD AUTO: 304 K/UL (ref 150–400)
PMV BLD AUTO: 10.8 FL (ref 8.9–12.9)
POTASSIUM SERPL-SCNC: 4.6 MMOL/L (ref 3.5–5.1)
PROT SERPL-MCNC: 7.7 G/DL (ref 6.4–8.2)
RBC # BLD AUTO: 5.08 M/UL (ref 4.1–5.7)
SODIUM SERPL-SCNC: 136 MMOL/L (ref 136–145)
TRIGL SERPL-MCNC: 96 MG/DL (ref ?–150)
TSH SERPL DL<=0.05 MIU/L-ACNC: 1.22 UIU/ML (ref 0.36–3.74)
VLDLC SERPL CALC-MCNC: 19.2 MG/DL
WBC # BLD AUTO: 6.8 K/UL (ref 4.1–11.1)

## 2021-08-08 NOTE — PROGRESS NOTES
Patient: Basil Camejo MRN: [id number]  SSN: [id number]    YOB: 1985  Age: 29 y.o. Sex: male        Subjective:     Chief Complaint   Patient presents with    Hypertension    Diabetes       HPI: he is a 29y.o. year old male who presents for follow up of chronic health conditions. Patient with hx of DM2, HTN and obesity. He is due for lab work. Patient denies HA, dizziness, SOB, CP, abdominal pain, dysuria, myalgias or arthralgias. H    Lab Results   Component Value Date/Time    Hemoglobin A1c 6.1 (H) 08/05/2021 11:00 AM    Hemoglobin A1c (POC) 6.6 08/11/2017 02:12 PM        BP Readings from Last 3 Encounters:   08/05/21 (!) 128/90   02/11/20 132/88   01/21/20 (!) 137/97     Wt Readings from Last 3 Encounters:   08/05/21 239 lb (108.4 kg)   02/11/20 261 lb (118.4 kg)   01/21/20 258 lb (117 kg)     Body mass index is 31.53 kg/m². Encounter Diagnoses   Name Primary?     Type 2 diabetes mellitus without complication, without long-term current use of insulin [facility] Yes    Mixed hyperlipidemia     Essential hypertension        Lab Results   Component Value Date/Time    WBC 6.8 08/05/2021 11:00 AM    Hemoglobin (POC) 13.9 02/28/2011 11:02 AM    HGB 16.0 08/05/2021 11:00 AM    Hematocrit (POC) 41 02/28/2011 11:02 AM    HCT 47.4 08/05/2021 11:00 AM    PLATELET 563 91/86/6213 11:00 AM    MCV 93.3 08/05/2021 11:00 AM       Lab Results   Component Value Date/Time    Cholesterol, total 155 08/05/2021 11:00 AM    HDL Cholesterol 44 08/05/2021 11:00 AM    LDL, calculated 91.8 08/05/2021 11:00 AM    Triglyceride 96 08/05/2021 11:00 AM    CHOL/HDL Ratio 3.5 08/05/2021 11:00 AM       Lab Results   Component Value Date/Time    TSH 1.22 08/05/2021 11:00 AM      Lab Results   Component Value Date/Time    Hemoglobin A1c 6.1 (H) 08/05/2021 11:00 AM    Hemoglobin A1c (POC) 6.6 08/11/2017 02:12 PM        Current and past medical information:    Current Medications after this visit[de-identified]     Current Outpatient Medications   Medication Sig    cyclobenzaprine (FLEXERIL) 5 mg tablet TAKE 1 TABLET BY MOUTH THREE TIMES DAILY AS NEEDED FOR MUSCLE SPASM    busPIRone (BUSPAR) 10 mg tablet Take 1 Tablet by mouth three (3) times daily.  Trulicity 8.23 IZ/6.0 mL sub-q pen INJECT 0.5ML(1 PEN) SUBCUTANEOUSLY ONCE A WEEK    metFORMIN ER (GLUCOPHAGE XR) 750 mg tablet TAKE 1 TABLET BY MOUTH BEFORE BREAKFAST AND DINNER    atorvastatin (LIPITOR) 20 mg tablet Take 1 tablet by mouth once daily    amLODIPine (NORVASC) 5 mg tablet Take 1 Tab by mouth daily.  ibuprofen 100 mg tablet Take 200 mg by mouth as needed for Pain. No current facility-administered medications for this visit. Patient Active Problem List    Diagnosis Date Noted    Facial abscess 08/12/2017    Left-sided face pain 08/12/2017    Left facial swelling 08/12/2017    Controlled type 2 diabetes mellitus without complication, without long-term current use of insulin ([facility] 08/12/2017    Ketonuria 10/06/2016    Hyperglycemia 10/05/2016    Rib pain 02/24/2014    Erectile dysfunction 02/24/2014    HTN (hypertension) 11/07/2013    ADD (attention deficit disorder) 11/07/2013       Past Medical History:   Diagnosis Date    Diabetes ([address]     HTN (hypertension) 11/7/2013       No Known Allergies    History reviewed. No pertinent surgical history.     Social History     Socioeconomic History    Marital status:      Spouse name: Not on file    Number of children: Not on file    Years of education: Not on file    Highest education level: Not on file   Tobacco Use    Smoking status: Never Smoker    Smokeless tobacco: Never Used   Substance and Sexual Activity    Alcohol use: No    Drug use: No     Social Determinants of Health     Financial Resource Strain:     Difficulty of Paying Living Expenses:    Food Insecurity:     Worried About Running Out of Food in the Last Year:     [address] in the Last Year:    Transportation Needs:     Lack of Transportation (Medical):  Lack of Transportation (Non-Medical):    Physical Activity:     Days of Exercise per Week:     Minutes of Exercise per Session:    Stress:     Feeling of Stress :    Social Connections:     Frequency of Communication with Friends and Family:     Frequency of Social Gatherings with Friends and Family:     Attends Sikhism Services:     Active Member of Clubs or Organizations:     Attends Club or Organization Meetings:     Marital Status:          Objective:     Review of Systems:  Constitutional: Negative for fatigue or malaise  Derm: Negative for rash or lesion  HEENT: Negative for acute hearing or vision changes  Cardiovascular: Negative for dizziness, chest pain or palpitations  Respiratory: Negative for cough, wheezing or SOB  Gastreintestinal: Negative for nausea or abdominal pain  Genital/urinary: Negative for dysuria or voiding dysfunction  Muscoloskeletal: Negative for acute myalgias or arthralgias   Neurological: Negative for headache, weakness or paresthesia  Psychological: Negative for depression or anxiety      Vitals:    08/05/21 0923 08/05/21 0935   BP: (!) 134/91 (!) 128/90   Pulse: 75    Resp: 16    Temp: 98 °F (36.7 °C)    SpO2: 96%    Weight: 239 lb (108.4 kg)    Height: 6' 1\" (1.854 m)       Body mass index is 31.53 kg/m². Physical Exam:  Constitutional: well developed, well nourished, in no acute distress  Skin: warm and dry, normal tone and turgor  Head: normocephalic, atraumatic  Eyes: sclera clear, EOMI  Neck: normal range of motion  Cardiovascular: normal S1, S2, regular rate and rhythm  Respiratory: clear to auscultation bilaterally with symmetrical effort  Abdomen: soft, BS normal  Extremities: full range of motion  Neurology: normal strength and sensation  Psych: active, alert and oriented, affect appropriate       Assessment and orders:       ICD-10-CM ICD-9-CM    1.  Type 2 diabetes mellitus without complication, without long-term current use of insulin (HCC)  E11.9 250.00 LIPID PANEL      METABOLIC PANEL, COMPREHENSIVE      CBC W/O DIFF      HEMOGLOBIN A1C WITH EAG      TSH 3RD GENERATION      TSH 3RD GENERATION      HEMOGLOBIN A1C WITH EAG      CBC W/O DIFF      METABOLIC PANEL, COMPREHENSIVE      LIPID PANEL   2. Mixed hyperlipidemia  E78.2 272.2 LIPID PANEL      METABOLIC PANEL, COMPREHENSIVE      METABOLIC PANEL, COMPREHENSIVE      LIPID PANEL   3. Essential hypertension  I00 052.9 METABOLIC PANEL, COMPREHENSIVE      TSH 3RD GENERATION      TSH 3RD GENERATION      METABOLIC PANEL, COMPREHENSIVE       Plan of care:  Diagnoses were discussed in detail with patient. Medications reviewed and appropriate. Patient to continue current prescribed medications as written. Medication risks/benefits/side effects discussed with patient. All of the patient's questions were addressed and answered to apparent satisfaction. The patient understands and agrees with our plan of care. The patient knows to call back if they have questions about the plan of care or if symptoms change. The patient received an After-Visit Summary which contains VS, diagnoses, orders, allergy and medication lists. No future appointments. Patient Care Team:  Ronaldo Carrion MD as PCP - General (Family Medicine)  Ronaldo Carrion MD as PCP - [facility] Provider        No future appointments.     Signed By: Carola Hay MD     August 8, 2021

## 2022-01-14 DIAGNOSIS — E11.9 TYPE 2 DIABETES MELLITUS WITHOUT COMPLICATION, WITHOUT LONG-TERM CURRENT USE OF INSULIN (HCC): ICD-10-CM

## 2022-01-15 RX ORDER — DULAGLUTIDE 0.75 MG/.5ML
INJECTION, SOLUTION SUBCUTANEOUS
Qty: 4 ML | Refills: 2 | Status: SHIPPED | OUTPATIENT
Start: 2022-01-15 | End: 2022-05-19

## 2022-03-18 PROBLEM — E11.9 CONTROLLED TYPE 2 DIABETES MELLITUS WITHOUT COMPLICATION, WITHOUT LONG-TERM CURRENT USE OF INSULIN (HCC): Status: ACTIVE | Noted: 2017-08-12

## 2022-03-19 PROBLEM — L02.01 FACIAL ABSCESS: Status: ACTIVE | Noted: 2017-08-12

## 2022-03-19 PROBLEM — R22.0 LEFT FACIAL SWELLING: Status: ACTIVE | Noted: 2017-08-12

## 2022-03-20 PROBLEM — R51.9 LEFT-SIDED FACE PAIN: Status: ACTIVE | Noted: 2017-08-12

## 2022-07-26 ENCOUNTER — OFFICE VISIT (OUTPATIENT)
Dept: FAMILY MEDICINE CLINIC | Age: 37
End: 2022-07-26
Payer: COMMERCIAL

## 2022-07-26 VITALS
HEIGHT: 73 IN | DIASTOLIC BLOOD PRESSURE: 100 MMHG | HEART RATE: 88 BPM | RESPIRATION RATE: 18 BRPM | WEIGHT: 243 LBS | TEMPERATURE: 98 F | BODY MASS INDEX: 32.2 KG/M2 | SYSTOLIC BLOOD PRESSURE: 137 MMHG | OXYGEN SATURATION: 95 %

## 2022-07-26 DIAGNOSIS — I10 ESSENTIAL HYPERTENSION: ICD-10-CM

## 2022-07-26 DIAGNOSIS — M25.511 CHRONIC RIGHT SHOULDER PAIN: ICD-10-CM

## 2022-07-26 DIAGNOSIS — G89.29 CHRONIC RIGHT SHOULDER PAIN: ICD-10-CM

## 2022-07-26 DIAGNOSIS — E11.9 TYPE 2 DIABETES MELLITUS WITHOUT COMPLICATION, WITHOUT LONG-TERM CURRENT USE OF INSULIN (HCC): Primary | ICD-10-CM

## 2022-07-26 LAB — HBA1C MFR BLD HPLC: 9.2 %

## 2022-07-26 PROCEDURE — 83036 HEMOGLOBIN GLYCOSYLATED A1C: CPT | Performed by: FAMILY MEDICINE

## 2022-07-26 PROCEDURE — 99214 OFFICE O/P EST MOD 30 MIN: CPT | Performed by: FAMILY MEDICINE

## 2022-07-26 PROCEDURE — 3046F HEMOGLOBIN A1C LEVEL >9.0%: CPT | Performed by: FAMILY MEDICINE

## 2022-07-26 PROCEDURE — 20610 DRAIN/INJ JOINT/BURSA W/O US: CPT | Performed by: FAMILY MEDICINE

## 2022-07-26 RX ORDER — TRIAMCINOLONE ACETONIDE 40 MG/ML
40 INJECTION, SUSPENSION INTRA-ARTICULAR; INTRAMUSCULAR ONCE
Status: COMPLETED | OUTPATIENT
Start: 2022-07-26 | End: 2022-07-26

## 2022-07-26 RX ORDER — AMLODIPINE BESYLATE 5 MG/1
5 TABLET ORAL DAILY
Qty: 90 TABLET | Refills: 1 | Status: SHIPPED | OUTPATIENT
Start: 2022-07-26

## 2022-07-26 RX ADMIN — TRIAMCINOLONE ACETONIDE 40 MG: 40 INJECTION, SUSPENSION INTRA-ARTICULAR; INTRAMUSCULAR at 16:45

## 2022-07-26 NOTE — PROGRESS NOTES
Chief Complaint   Patient presents with    Shoulder Pain     Right shoulder pain- 5 months, sharp pain, decreased ROM. 1. \"Have you been to the ER, urgent care clinic since your last visit? Hospitalized since your last visit? \" No    2. \"Have you seen or consulted any other health care providers outside of the [address] [place] [address] since your last visit? \" No     3. For patients aged 39-70: Has the patient had a colonoscopy / FIT/ Cologuard? NA - based on age      If the patient is female:    4. For patients aged 41-77: Has the patient had a mammogram within the past 2 years? NA - based on age or sex      11. For patients aged 21-65: Has the patient had a pap smear?  NA - based on age or sex    Health Maintenance Due   Topic Date Due    COVID-19 Vaccine (1) Never done    Pneumococcal 0-64 years (1 - PCV) Never done    Eye Exam Retinal or Dilated  Never done    Foot Exam Q1  01/21/2021    MICROALBUMIN Q1  01/21/2021    Depression Screen  08/05/2022    A1C test (Diabetic or Prediabetic)  08/05/2022    Lipid Screen  08/05/2022

## 2022-07-26 NOTE — PROGRESS NOTES
Patient: Jojo Hayden MRN: [id number]  SSN: [id number]    YOB: 1985  Age: 40 y.o. Sex: male        Subjective:     Chief Complaint   Patient presents with    Shoulder Pain     Right shoulder pain- 5 months, sharp pain, decreased ROM. HPI: he is a 40y.o. year old male who presents for progressive right shoulder pain and decreased ROM. Patient with hx of T2D, HTN and obesity. He is due for lab work. Patient denies HA, dizziness, SOB, CP, abdominal pain, dysuria, weakness or paresthesia. Lab Results   Component Value Date/Time    Hemoglobin A1c 6.1 (H) 08/05/2021 11:00 AM    Hemoglobin A1c (POC) 9.2 07/26/2022 04:39 PM        BP Readings from Last 3 Encounters:   07/26/22 (!) 137/100   08/05/21 (!) 128/90   02/11/20 132/88     Wt Readings from Last 3 Encounters:   07/26/22 243 lb (110.2 kg)   08/05/21 239 lb (108.4 kg)   02/11/20 261 lb (118.4 kg)     Body mass index is 32.06 kg/m². Encounter Diagnoses   Name Primary?     Type 2 diabetes mellitus without complication, without long-term current use of insulin (HCC) Yes    Chronic right shoulder pain     Essential hypertension        Lab Results   Component Value Date/Time    WBC 6.8 08/05/2021 11:00 AM    Hemoglobin (POC) 13.9 02/28/2011 11:02 AM    HGB 16.0 08/05/2021 11:00 AM    Hematocrit (POC) 41 02/28/2011 11:02 AM    HCT 47.4 08/05/2021 11:00 AM    PLATELET 466 75/31/6748 11:00 AM    MCV 93.3 08/05/2021 11:00 AM       Lab Results   Component Value Date/Time    Cholesterol, total 155 08/05/2021 11:00 AM    HDL Cholesterol 44 08/05/2021 11:00 AM    LDL, calculated 91.8 08/05/2021 11:00 AM    Triglyceride 96 08/05/2021 11:00 AM    CHOL/HDL Ratio 3.5 08/05/2021 11:00 AM       Lab Results   Component Value Date/Time    TSH 1.22 08/05/2021 11:00 AM      Lab Results   Component Value Date/Time    Hemoglobin A1c 6.1 (H) 08/05/2021 11:00 AM    Hemoglobin A1c (POC) 9.2 07/26/2022 04:39 PM        Current and past medical information:    Current Medications after this visit[de-identified]     Current Outpatient Medications   Medication Sig    amLODIPine (NORVASC) 5 mg tablet Take 1 Tablet by mouth in the morning. Trulicity 8.85 AO/7.4 mL sub-q pen INJECT 0.75MG UNDER THE SKIN ONCE A WEEK    metFORMIN ER (GLUCOPHAGE XR) 750 mg tablet TAKE 1 TABLET BY MOUTH BEFORE BREAKFAST AND DINNER     No current facility-administered medications for this visit. Patient Active Problem List    Diagnosis Date Noted    Facial abscess 08/12/2017    Left-sided face pain 08/12/2017    Left facial swelling 08/12/2017    Controlled type 2 diabetes mellitus without complication, without long-term current use of insulin ([facility] 08/12/2017    Ketonuria 10/06/2016    Hyperglycemia 10/05/2016    Rib pain 02/24/2014    Erectile dysfunction 02/24/2014    HTN (hypertension) 11/07/2013    ADD (attention deficit disorder) 11/07/2013       Past Medical History:   Diagnosis Date    Diabetes ([facility]     HTN (hypertension) 11/7/2013       No Known Allergies    History reviewed. No pertinent surgical history.     Social History     Socioeconomic History    Marital status:    Tobacco Use    Smoking status: Never    Smokeless tobacco: Never   Substance and Sexual Activity    Alcohol use: No    Drug use: No     Social Determinants of Health     Financial Resource Strain: Medium Risk    Difficulty of Paying Living Expenses: Somewhat hard   Food Insecurity: No Food Insecurity    Worried About Running Out of Food in the Last Year: Never true    Ran Out of Food in the Last Year: Never true         Objective:     Review of Systems:  Constitutional: Negative for fatigue or malaise  Derm: Negative for rash or lesion  HEENT: Negative for acute hearing or vision changes  Cardiovascular: Negative for dizziness, chest pain or palpitations  Respiratory: Negative for cough, wheezing or SOB  Gastreintestinal: Negative for nausea or abdominal pain  Genital/urinary: Negative for dysuria or voiding dysfunction  Muscoloskeletal: Negative for acute myalgias or arthralgias   Neurological: Negative for headache, weakness or paresthesia  Psychological: Negative for depression or anxiety      Vitals:    07/26/22 1318 07/26/22 1355   BP: (!) 142/85 (!) 137/100   Pulse: 88    Resp: 18    Temp: 98 °F (36.7 °C)    TempSrc: Oral    SpO2: 95%    Weight: 243 lb (110.2 kg)    Height: 6' 1\" (1.854 m)       Body mass index is 32.06 kg/m². Physical Exam:  Constitutional: well developed, well nourished, in no acute distress  Skin: warm and dry, normal tone and turgor  Head: normocephalic, atraumatic  Eyes: sclera clear, EOMI  Neck: normal range of motion  Cardiovascular: regular rate and rhythm  Respiratory: clear to auscultation bilaterally with symmetrical effort  Extremities: right shoulder with reduced range of motion: abduction, adduction, internal and external rotation  Neurology: normal strength and sensation  Psych: active, alert and oriented, affect appropriate       Assessment and orders:       ICD-10-CM ICD-9-CM    1. Type 2 diabetes mellitus without complication, without long-term current use of insulin ([facility])  E11.9 250.00 COLLECTION CAPILLARY BLOOD SPECIMEN      2. Chronic right shoulder pain  M25.511 719.41 XR SHOULDER RT AP/LAT MIN 2 V    G89.29 338.29 triamcinolone acetonide (KENALOG-40) 40 mg/mL injection 40 mg      3. Essential hypertension  I10 401.9 amLODIPine (NORVASC) 5 mg tablet      AMB POC HEMOGLOBIN A1C          Plan of care:  Diagnoses were discussed in detail with patient. Discussed that symptoms consistent with frozen shoulder. Patient has agreed to the procedure and understands the risk of adverse reactions. Procedure:  Joint Injection:  Rigt shoulder  Injected joint(s) with sterile technique using 3cc of equally mixed 1% Lidocaine with 40 mg of Kenalog with relief and no adverse reaction to procedure. Patient tolerated injection well.  Patient given instructions and expectations of after visit care. Patient to call back on effectiveness of injection. Medications reviewed and appropriate. Patient to continue current prescribed medications as written. Medication risks/benefits/side effects discussed with patient. All of the patient's questions were addressed and answered to apparent satisfaction. The patient understands and agrees with our plan of care. The patient knows to call back if they have questions about the plan of care or if symptoms change. The patient received an After-Visit Summary which contains VS, diagnoses, orders, allergy and medication lists. No future appointments. Patient Care Team:  Robi Chacon MD as PCP - General (Family Medicine)  Robi Chacon MD as PCP - [facility] Provider    Follow-up and Dispositions    Return in about 6 weeks (around 9/6/2022), or if symptoms worsen or fail to improve. No future appointments.     Signed By: Ishaan Grimm MD     July 28, 2022

## 2022-11-21 ENCOUNTER — OFFICE VISIT (OUTPATIENT)
Dept: FAMILY MEDICINE CLINIC | Age: 37
End: 2022-11-21

## 2022-11-21 VITALS
HEIGHT: 73 IN | TEMPERATURE: 98.3 F | WEIGHT: 241 LBS | DIASTOLIC BLOOD PRESSURE: 84 MMHG | HEART RATE: 90 BPM | RESPIRATION RATE: 18 BRPM | BODY MASS INDEX: 31.94 KG/M2 | OXYGEN SATURATION: 97 % | SYSTOLIC BLOOD PRESSURE: 125 MMHG

## 2022-11-21 DIAGNOSIS — E11.9 TYPE 2 DIABETES MELLITUS WITHOUT COMPLICATION, WITHOUT LONG-TERM CURRENT USE OF INSULIN (HCC): Primary | ICD-10-CM

## 2022-11-21 DIAGNOSIS — I10 ESSENTIAL HYPERTENSION: ICD-10-CM

## 2022-11-21 LAB — HBA1C MFR BLD HPLC: 10.5 %

## 2022-11-21 PROCEDURE — 83036 HEMOGLOBIN GLYCOSYLATED A1C: CPT | Performed by: FAMILY MEDICINE

## 2022-11-21 PROCEDURE — 3074F SYST BP LT 130 MM HG: CPT | Performed by: FAMILY MEDICINE

## 2022-11-21 PROCEDURE — 3078F DIAST BP <80 MM HG: CPT | Performed by: FAMILY MEDICINE

## 2022-11-21 PROCEDURE — 99213 OFFICE O/P EST LOW 20 MIN: CPT | Performed by: FAMILY MEDICINE

## 2022-11-21 PROCEDURE — 3046F HEMOGLOBIN A1C LEVEL >9.0%: CPT | Performed by: FAMILY MEDICINE

## 2022-11-21 PROCEDURE — 36416 COLLJ CAPILLARY BLOOD SPEC: CPT | Performed by: FAMILY MEDICINE

## 2022-11-21 NOTE — PROGRESS NOTES
1. Have you been to the ER, urgent care clinic since your last visit? Hospitalized since your last visit? No    2. Have you seen or consulted any other health care providers outside of the [address] [place] [address] since your last visit? Include any pap smears or colon screening. No    3. For patients aged 39-70: Has the patient had a colonoscopy / FIT/ Cologuard? NA - based on age    If the patient is female:    4. For patients aged 41-77: Has the patient had a mammogram within the past 2 years? NA - based on age or sex      11. For patients aged 21-65: Has the patient had a pap smear? NA - based on age or sex     Reviewed record in preparation for visit and have necessary documentation  Pt did not bring medication to office visit for review  Patient is accompanied by self I have received verbal consent from Zay Pierre to discuss any/all medical information while they are present in the room.     Goals that were addressed and/or need to be completed during or after this appointment include     Health Maintenance Due   Topic Date Due    Pneumococcal 0-64 years (1 - PCV) Never done    Eye Exam Retinal or Dilated  Never done    Hepatitis B Vaccine (1 of 3 - Risk 3-dose series) Never done    Foot Exam Q1  01/21/2021    MICROALBUMIN Q1  01/21/2021    COVID-19 Vaccine (3 - Booster for Moderna series) 08/16/2021    Flu Vaccine (1) 08/01/2022    Lipid Screen  08/05/2022    A1C test (Diabetic or Prediabetic)  10/26/2022

## 2022-11-25 NOTE — PROGRESS NOTES
Patient: Frankie Sanchez MRN: [id number]  SSN: [id number]    YOB: 1985  Age: 40 y.o. Sex: male        Subjective:     Chief Complaint   Patient presents with    Blood Pressure Check       HPI: he is a 40y.o. year old male who presents for BP check. He has had physical for new employment and BP high at that time. Patient with hx of T2D, HTN and obesity. He is due for lab work. Patient does not have health insurance. Will return when he gets this through his new job. Lab Results   Component Value Date/Time    Hemoglobin A1c 6.1 (H) 08/05/2021 11:00 AM    Hemoglobin A1c (POC) 10.5 11/21/2022 10:41 AM        BP Readings from Last 3 Encounters:   11/21/22 125/84   07/26/22 (!) 137/100   08/05/21 (!) 128/90     Wt Readings from Last 3 Encounters:   11/21/22 241 lb (109.3 kg)   07/26/22 243 lb (110.2 kg)   08/05/21 239 lb (108.4 kg)     Body mass index is 31.8 kg/m². Encounter Diagnoses   Name Primary?     Type 2 diabetes mellitus without complication, without long-term current use of insulin (HCC) Yes    Essential hypertension        Lab Results   Component Value Date/Time    WBC 6.8 08/05/2021 11:00 AM    Hemoglobin (POC) 13.9 02/28/2011 11:02 AM    HGB 16.0 08/05/2021 11:00 AM    Hematocrit (POC) 41 02/28/2011 11:02 AM    HCT 47.4 08/05/2021 11:00 AM    PLATELET 301 63/20/6442 11:00 AM    MCV 93.3 08/05/2021 11:00 AM       Lab Results   Component Value Date/Time    Cholesterol, total 155 08/05/2021 11:00 AM    HDL Cholesterol 44 08/05/2021 11:00 AM    LDL, calculated 91.8 08/05/2021 11:00 AM    Triglyceride 96 08/05/2021 11:00 AM    CHOL/HDL Ratio 3.5 08/05/2021 11:00 AM       Lab Results   Component Value Date/Time    TSH 1.22 08/05/2021 11:00 AM      Lab Results   Component Value Date/Time    Hemoglobin A1c 6.1 (H) 08/05/2021 11:00 AM    Hemoglobin A1c (POC) 10.5 11/21/2022 10:41 AM        Current and past medical information:    Current Medications after this visit[de-identified]     Current Outpatient Medications   Medication Sig    amLODIPine (NORVASC) 5 mg tablet Take 1 Tablet by mouth in the morning. metFORMIN ER (GLUCOPHAGE XR) 750 mg tablet TAKE 1 TABLET BY MOUTH BEFORE BREAKFAST AND DINNER     No current facility-administered medications for this visit. Patient Active Problem List    Diagnosis Date Noted    Facial abscess 08/12/2017    Left-sided face pain 08/12/2017    Left facial swelling 08/12/2017    Controlled type 2 diabetes mellitus without complication, without long-term current use of insulin ([facility] 08/12/2017    Ketonuria 10/06/2016    Hyperglycemia 10/05/2016    Rib pain 02/24/2014    Erectile dysfunction 02/24/2014    HTN (hypertension) 11/07/2013    ADD (attention deficit disorder) 11/07/2013       Past Medical History:   Diagnosis Date    Diabetes ([facility]     HTN (hypertension) 11/7/2013       No Known Allergies    History reviewed. No pertinent surgical history.     Social History     Socioeconomic History    Marital status:    Tobacco Use    Smoking status: Never    Smokeless tobacco: Never   Substance and Sexual Activity    Alcohol use: No    Drug use: No     Social Determinants of Health     Financial Resource Strain: Medium Risk    Difficulty of Paying Living Expenses: Somewhat hard   Food Insecurity: No Food Insecurity    Worried About Running Out of Food in the Last Year: Never true    Ran Out of Food in the Last Year: Never true         Objective:     Review of Systems:  Constitutional: Negative for fatigue or malaise  Derm: Negative for rash or lesion  HEENT: Negative for acute hearing or vision changes  Cardiovascular: Negative for dizziness, chest pain or palpitations  Respiratory: Negative for cough, wheezing or SOB  Gastrointestinal: Negative for nausea or abdominal pain  Genital/urinary: Negative for dysuria or voiding dysfunction  Musculoskeletal: Negative for acute myalgias or arthralgias   Neurological: Negative for headache, weakness or paresthesia  Psychological: Negative for depression or anxiety      Vitals:    11/21/22 0942   BP: 125/84   Pulse: 90   Resp: 18   Temp: 98.3 °F (36.8 °C)   TempSrc: Oral   SpO2: 97%   Weight: 241 lb (109.3 kg)   Height: 6' 1\" (1.854 m)      Body mass index is 31.8 kg/m². Physical Exam:  Constitutional: well developed, well nourished, in no acute distress  Skin: warm and dry, normal tone and turgor  Head: normocephalic, atraumatic  Eyes: sclera clear, EOMI  Neck: normal range of motion  Cardiovascular: regular rate and rhythm  Respiratory: clear to auscultation bilaterally with symmetrical effort  Extremities: FROM  Neurology: normal strength and sensation  Psych: active, alert and oriented, affect appropriate       Assessment and orders:       ICD-10-CM ICD-9-CM    1. Type 2 diabetes mellitus without complication, without long-term current use of insulin (HCC)  E11.9 250.00 AMB POC HEMOGLOBIN A1C      COLLECTION CAPILLARY BLOOD SPECIMEN      2. Essential hypertension  I10 401.9           Plan of care:  Diagnoses were discussed in detail with patient. Medications reviewed and appropriate. Patient to continue current prescribed medications as written. Medication risks/benefits/side effects discussed with patient. All of the patient's questions were addressed and answered to apparent satisfaction. The patient understands and agrees with our plan of care. The patient knows to call back if they have questions about the plan of care or if symptoms change. The patient received an After-Visit Summary which contains VS, diagnoses, orders, allergy and medication lists. No future appointments. Patient Care Team:  Cortney Gomez MD as PCP - General (Family Medicine)  Cortney Gomez MD as PCP - [facility]          No future appointments.     Signed By: Stephanie Reyes MD     November 24, 2022

## 2023-02-28 ENCOUNTER — OFFICE VISIT (OUTPATIENT)
Dept: FAMILY MEDICINE CLINIC | Age: 38
End: 2023-02-28
Payer: COMMERCIAL

## 2023-02-28 VITALS
SYSTOLIC BLOOD PRESSURE: 133 MMHG | OXYGEN SATURATION: 97 % | DIASTOLIC BLOOD PRESSURE: 83 MMHG | RESPIRATION RATE: 20 BRPM | TEMPERATURE: 97.6 F | HEART RATE: 90 BPM | WEIGHT: 240 LBS | BODY MASS INDEX: 31.81 KG/M2 | HEIGHT: 73 IN

## 2023-02-28 DIAGNOSIS — I10 ESSENTIAL HYPERTENSION: ICD-10-CM

## 2023-02-28 DIAGNOSIS — E11.9 TYPE 2 DIABETES MELLITUS WITHOUT COMPLICATION, WITHOUT LONG-TERM CURRENT USE OF INSULIN (HCC): Primary | ICD-10-CM

## 2023-02-28 LAB — HBA1C MFR BLD HPLC: 11 %

## 2023-02-28 PROCEDURE — 3079F DIAST BP 80-89 MM HG: CPT | Performed by: FAMILY MEDICINE

## 2023-02-28 PROCEDURE — 83036 HEMOGLOBIN GLYCOSYLATED A1C: CPT | Performed by: FAMILY MEDICINE

## 2023-02-28 PROCEDURE — 3075F SYST BP GE 130 - 139MM HG: CPT | Performed by: FAMILY MEDICINE

## 2023-02-28 PROCEDURE — 3046F HEMOGLOBIN A1C LEVEL >9.0%: CPT | Performed by: FAMILY MEDICINE

## 2023-02-28 PROCEDURE — 99214 OFFICE O/P EST MOD 30 MIN: CPT | Performed by: FAMILY MEDICINE

## 2023-02-28 RX ORDER — AMLODIPINE BESYLATE 5 MG/1
5 TABLET ORAL DAILY
Qty: 90 TABLET | Refills: 1 | Status: SHIPPED | OUTPATIENT
Start: 2023-02-28

## 2023-02-28 RX ORDER — DULAGLUTIDE 0.75 MG/.5ML
0.75 INJECTION, SOLUTION SUBCUTANEOUS
Qty: 4 ML | Refills: 1 | Status: SHIPPED | OUTPATIENT
Start: 2023-02-28

## 2023-02-28 RX ORDER — METFORMIN HYDROCHLORIDE 750 MG/1
TABLET, EXTENDED RELEASE ORAL
Qty: 180 TABLET | Refills: 1 | Status: SHIPPED | OUTPATIENT
Start: 2023-02-28

## 2023-02-28 NOTE — LETTER
NOTIFICATION RETURN TO WORK     2/28/2023 11:57 AM    Mr. Shirley [phone]      To Whom It May Concern:    Shirley Espinoza is currently under the care of Neftali Dixon. He will return to work on: 03/01/2023. Please excuse for yesterday and today. If there are questions or concerns please have the patient contact our office.         Sincerely,      Ever Quintanilla MD

## 2023-02-28 NOTE — PROGRESS NOTES
1. \"Have you been to the ER, urgent care clinic since your last visit? Hospitalized since your last visit? \" No    2. \"Have you seen or consulted any other health care providers outside of the [address] [place] [address] since your last visit? \" No     3. For patients aged 39-70: Has the patient had a colonoscopy / FIT/ Cologuard? NA - based on age      If the patient is female:    4. For patients aged 41-77: Has the patient had a mammogram within the past 2 years? NA - based on age or sex      11. For patients aged 21-65: Has the patient had a pap smear?  NA - based on age or sex    Health Maintenance Due   Topic Date Due    Pneumococcal 0-64 years (1 - PCV) Never done    Eye Exam Retinal or Dilated  Never done    Hepatitis B Vaccine (1 of 3 - Risk 3-dose series) Never done    Diabetic Alb to Cr ratio (uACR) test  01/21/2021    Foot Exam Q1  01/21/2021    COVID-19 Vaccine (3 - Booster for Moderna series) 08/16/2021    Flu Vaccine (1) 08/01/2022    GFR test (Diabetes, CKD 3-4, OR last GFR 15-59)  08/05/2022    Lipid Screen  08/05/2022    A1C test (Diabetic or Prediabetic)  02/21/2023

## 2023-03-01 NOTE — PROGRESS NOTES
Patient: Anali Hines MRN: [id number]  SSN: [id number]    YOB: 1985  Age: 40 y.o. Sex: male        Subjective:     Chief Complaint   Patient presents with    Medication Refill     Elevated blood sugar- concerned        HPI: he is a 40y.o. year old male who presents for follow up of chronic health conditions. Patient with hx of T2D, HTN and obesity. Patient has been without health insurance. He has been out of his medications. Lab Results   Component Value Date/Time    Hemoglobin A1c 6.1 (H) 08/05/2021 11:00 AM    Hemoglobin A1c (POC) 11.0 02/28/2023 12:07 PM        BP Readings from Last 3 Encounters:   02/28/23 133/83   11/21/22 125/84   07/26/22 (!) 137/100     Wt Readings from Last 3 Encounters:   02/28/23 240 lb (108.9 kg)   11/21/22 241 lb (109.3 kg)   07/26/22 243 lb (110.2 kg)     Body mass index is 31.66 kg/m². Encounter Diagnoses   Name Primary?     Type 2 diabetes mellitus without complication, without long-term current use of insulin (HCC) Yes    Essential hypertension        Lab Results   Component Value Date/Time    WBC 6.8 08/05/2021 11:00 AM    Hemoglobin (POC) 13.9 02/28/2011 11:02 AM    HGB 16.0 08/05/2021 11:00 AM    Hematocrit (POC) 41 02/28/2011 11:02 AM    HCT 47.4 08/05/2021 11:00 AM    PLATELET 830 62/79/5658 11:00 AM    MCV 93.3 08/05/2021 11:00 AM       Lab Results   Component Value Date/Time    Cholesterol, total 155 08/05/2021 11:00 AM    HDL Cholesterol 44 08/05/2021 11:00 AM    LDL, calculated 91.8 08/05/2021 11:00 AM    Triglyceride 96 08/05/2021 11:00 AM    CHOL/HDL Ratio 3.5 08/05/2021 11:00 AM       Lab Results   Component Value Date/Time    TSH 1.22 08/05/2021 11:00 AM      Lab Results   Component Value Date/Time    Hemoglobin A1c 6.1 (H) 08/05/2021 11:00 AM    Hemoglobin A1c (POC) 11.0 02/28/2023 12:07 PM        Current and past medical information:    Current Medications after this visit[de-identified]     Current Outpatient Medications   Medication Sig    amLODIPine (NORVASC) 5 mg tablet Take 1 Tablet by mouth daily. metFORMIN ER (GLUCOPHAGE XR) 750 mg tablet TAKE 1 TABLET BY MOUTH BEFORE BREAKFAST AND DINNER    dulaglutide (Trulicity) 5.21 MR/5.4 mL sub-q pen 0.5 mL by SubCUTAneous route every seven (7) days. No current facility-administered medications for this visit. Patient Active Problem List    Diagnosis Date Noted    Facial abscess 08/12/2017    Left-sided face pain 08/12/2017    Left facial swelling 08/12/2017    Controlled type 2 diabetes mellitus without complication, without long-term current use of insulin ([facility] [address] 08/12/2017    Ketonuria 10/06/2016    Hyperglycemia 10/05/2016    Rib pain 02/24/2014    Erectile dysfunction 02/24/2014    HTN (hypertension) 11/07/2013    ADD (attention deficit disorder) 11/07/2013       Past Medical History:   Diagnosis Date    [facility]     HTN (hypertension) 11/7/2013       No Known Allergies    History reviewed. No pertinent surgical history.     Social History     Socioeconomic History    Marital status:    Tobacco Use    Smoking status: Never    Smokeless tobacco: Never   Substance and Sexual Activity    Alcohol use: No    Drug use: No     Social Determinants of Health     Financial Resource Strain: Medium Risk    Difficulty of Paying Living Expenses: Somewhat hard   Food Insecurity: No Food Insecurity    Worried About Running Out of Food in the Last Year: Never true    Ran Out of Food in the Last Year: Never true         Objective:     Review of Systems:  Constitutional: Negative for fatigue or malaise  HEENT: Negative for acute hearing or vision changes  Cardiovascular: Negative for dizziness, chest pain or palpitations  Respiratory: Negative for cough, wheezing or SOB  Musculoskeletal: Negative for acute myalgias or arthralgias   Neurological: Negative for headache, weakness or paresthesia  Psychological: Negative for depression or anxiety      Vitals:    02/28/23 1115   BP: 133/83   Pulse: 90   Resp: 20 Temp: 97.6 °F (36.4 °C)   TempSrc: Oral   SpO2: 97%   Weight: 240 lb (108.9 kg)   Height: 6' 1\" (1.854 m)      Body mass index is 31.66 kg/m². Physical Exam:  Constitutional: well developed, well nourished, in no acute distress  Head: normocephalic, atraumatic  Eyes: sclera clear, EOMI  Neck: normal range of motion  Cardiovascular: regular rate and rhythm  Respiratory: clear to auscultation bilaterally with symmetrical effort  Extremities: FROM  Neurology: normal strength and sensation  Psych: active, alert and oriented, affect appropriate       Assessment and orders:       ICD-10-CM ICD-9-CM    1. Type 2 diabetes mellitus without complication, without long-term current use of insulin ([facility])  E11.9 250.00 metFORMIN ER (GLUCOPHAGE XR) 750 mg tablet      dulaglutide (Trulicity) 5.48 IY/9.5 mL sub-q pen      AMB POC HEMOGLOBIN A1C      COLLECTION CAPILLARY BLOOD SPECIMEN      2. Essential hypertension  I10 401.9 amLODIPine (NORVASC) 5 mg tablet          Plan of care:  Diagnoses were discussed in detail with patient. Medications reviewed and appropriate. Patient to continue current prescribed medications as written. Medication risks/benefits/side effects discussed with patient. All of the patient's questions were addressed and answered to apparent satisfaction. The patient understands and agrees with our plan of care. The patient knows to call back if they have questions about the plan of care or if symptoms change. The patient received an After-Visit Summary which contains VS, diagnoses, orders, allergy and medication lists. No future appointments. Patient Care Team:  Chris Yadav MD as PCP - General (Family Medicine)  Chris Yadav MD as PCP - REHABILITATION [facility] Provider    Follow-up and Dispositions    Return in about 3 months (around 5/28/2023), or if symptoms worsen or fail to improve. No future appointments.     Signed By: Toño Martel MD     February 28, 2023

## 2023-07-14 RX ORDER — DULAGLUTIDE 0.75 MG/.5ML
INJECTION, SOLUTION SUBCUTANEOUS
Qty: 4 ML | Refills: 1 | Status: SHIPPED | OUTPATIENT
Start: 2023-07-14

## 2023-09-05 ENCOUNTER — OFFICE VISIT (OUTPATIENT)
Facility: CLINIC | Age: 38
End: 2023-09-05
Payer: COMMERCIAL

## 2023-09-05 VITALS
BODY MASS INDEX: 32.05 KG/M2 | HEIGHT: 73 IN | WEIGHT: 241.8 LBS | TEMPERATURE: 97.7 F | RESPIRATION RATE: 14 BRPM | HEART RATE: 95 BPM | SYSTOLIC BLOOD PRESSURE: 131 MMHG | OXYGEN SATURATION: 96 % | DIASTOLIC BLOOD PRESSURE: 92 MMHG

## 2023-09-05 DIAGNOSIS — E78.2 MIXED HYPERLIPIDEMIA: ICD-10-CM

## 2023-09-05 DIAGNOSIS — I10 ESSENTIAL (PRIMARY) HYPERTENSION: ICD-10-CM

## 2023-09-05 DIAGNOSIS — E11.9 TYPE 2 DIABETES MELLITUS WITHOUT COMPLICATION, WITHOUT LONG-TERM CURRENT USE OF INSULIN (HCC): Primary | ICD-10-CM

## 2023-09-05 LAB — HBA1C MFR BLD: 9.8 %

## 2023-09-05 PROCEDURE — 83036 HEMOGLOBIN GLYCOSYLATED A1C: CPT | Performed by: FAMILY MEDICINE

## 2023-09-05 PROCEDURE — 3078F DIAST BP <80 MM HG: CPT | Performed by: FAMILY MEDICINE

## 2023-09-05 PROCEDURE — 99214 OFFICE O/P EST MOD 30 MIN: CPT | Performed by: FAMILY MEDICINE

## 2023-09-05 PROCEDURE — 3074F SYST BP LT 130 MM HG: CPT | Performed by: FAMILY MEDICINE

## 2023-09-05 RX ORDER — LIRAGLUTIDE 6 MG/ML
1.8 INJECTION SUBCUTANEOUS DAILY
Qty: 2 ADJUSTABLE DOSE PRE-FILLED PEN SYRINGE | Refills: 3 | Status: SHIPPED | OUTPATIENT
Start: 2023-09-05

## 2023-09-05 SDOH — ECONOMIC STABILITY: INCOME INSECURITY: HOW HARD IS IT FOR YOU TO PAY FOR THE VERY BASICS LIKE FOOD, HOUSING, MEDICAL CARE, AND HEATING?: NOT HARD AT ALL

## 2023-09-05 SDOH — ECONOMIC STABILITY: FOOD INSECURITY: WITHIN THE PAST 12 MONTHS, THE FOOD YOU BOUGHT JUST DIDN'T LAST AND YOU DIDN'T HAVE MONEY TO GET MORE.: NEVER TRUE

## 2023-09-05 SDOH — ECONOMIC STABILITY: HOUSING INSECURITY
IN THE LAST 12 MONTHS, WAS THERE A TIME WHEN YOU DID NOT HAVE A STEADY PLACE TO SLEEP OR SLEPT IN A SHELTER (INCLUDING NOW)?: NO

## 2023-09-05 SDOH — ECONOMIC STABILITY: FOOD INSECURITY: WITHIN THE PAST 12 MONTHS, YOU WORRIED THAT YOUR FOOD WOULD RUN OUT BEFORE YOU GOT MONEY TO BUY MORE.: NEVER TRUE

## 2023-09-06 LAB
ALBUMIN SERPL-MCNC: 4.5 G/DL (ref 3.5–5)
ALBUMIN/GLOB SERPL: 1.6 (ref 1.1–2.2)
ALP SERPL-CCNC: 72 U/L (ref 45–117)
ALT SERPL-CCNC: 70 U/L (ref 12–78)
ANION GAP SERPL CALC-SCNC: 8 MMOL/L (ref 5–15)
AST SERPL-CCNC: 19 U/L (ref 15–37)
BILIRUB SERPL-MCNC: 0.5 MG/DL (ref 0.2–1)
BUN SERPL-MCNC: 17 MG/DL (ref 6–20)
BUN/CREAT SERPL: 18 (ref 12–20)
CALCIUM SERPL-MCNC: 10.1 MG/DL (ref 8.5–10.1)
CHLORIDE SERPL-SCNC: 102 MMOL/L (ref 97–108)
CHOLEST SERPL-MCNC: 203 MG/DL
CO2 SERPL-SCNC: 27 MMOL/L (ref 21–32)
CREAT SERPL-MCNC: 0.92 MG/DL (ref 0.7–1.3)
GLOBULIN SER CALC-MCNC: 2.9 G/DL (ref 2–4)
GLUCOSE SERPL-MCNC: 260 MG/DL (ref 65–100)
HCT VFR BLD AUTO: 48.2 % (ref 36.6–50.3)
HDLC SERPL-MCNC: 41 MG/DL
HDLC SERPL: 5 (ref 0–5)
HGB BLD-MCNC: 16.5 G/DL (ref 12.1–17)
LDLC SERPL CALC-MCNC: 124.2 MG/DL (ref 0–100)
POTASSIUM SERPL-SCNC: 4.5 MMOL/L (ref 3.5–5.1)
PROT SERPL-MCNC: 7.4 G/DL (ref 6.4–8.2)
SODIUM SERPL-SCNC: 137 MMOL/L (ref 136–145)
TRIGL SERPL-MCNC: 189 MG/DL
TSH SERPL DL<=0.05 MIU/L-ACNC: 2.04 UIU/ML (ref 0.36–3.74)
VLDLC SERPL CALC-MCNC: 37.8 MG/DL

## 2023-09-08 ENCOUNTER — TELEPHONE (OUTPATIENT)
Facility: CLINIC | Age: 38
End: 2023-09-08

## 2023-09-08 NOTE — TELEPHONE ENCOUNTER
Called patient. He stated last night and this morning he has been having nausea, vomiting and headache after taking Victoza. Patient advised not to take medication. Verbalized understanding. Patient stated he has 2 doses of Trulicity left and asking if he can take this?

## 2023-09-08 NOTE — TELEPHONE ENCOUNTER
Liraglutide (VICTOZA) 18 MG/3ML SOPN SC injection     Pt has been dealing with a lot of side effects from this injection. Please advise if pt should continue taking it.

## 2023-09-11 RX ORDER — ATORVASTATIN CALCIUM 20 MG/1
20 TABLET, FILM COATED ORAL DAILY
Qty: 90 TABLET | Refills: 1 | Status: SHIPPED | OUTPATIENT
Start: 2023-09-11

## 2023-09-11 NOTE — TELEPHONE ENCOUNTER
Called patient, he was asleep spoke with wife. She was advised per Dr Goins:\"Since he has previously tolerated trulicity, I would wait a couple of weeks then try this again. Particularly as his non-insulin options are limited. \" Verbalized understanding.

## 2023-09-11 NOTE — TELEPHONE ENCOUNTER
Since he has previously tolerated trulicity, I would wait a couple of weeks then try this again. Particularly as his non-insulin options are limited.

## 2023-09-11 NOTE — PROGRESS NOTES
1. \"Have you been to the ER, urgent care clinic since your last visit? Hospitalized since your last visit? \" NO    2. \"Have you seen or consulted any other health care providers outside of the [address] [place] [address] since your last visit? \" no    3. For patients aged 43-73: Has the patient had a colonoscopy / FIT/ Cologuard? N/A      If the patient is female:    4. For patients aged 43-66: Has the patient had a mammogram within the past 2 years? N/A      5. For patients aged 21-65: Has the patient had a pap smear?  N/A    Health Maintenance Due   Topic Date Due    Varicella vaccine (1 of 2 - 2-dose childhood series) Never done    Pneumococcal 0-64 years Vaccine (1 - PCV) Never done    HIV screen  Never done    Diabetic retinal exam  Never done    Hepatitis C screen  Never done    Hepatitis B vaccine (1 of 3 - Risk 3-dose series) Never done    Diabetic foot exam  01/21/2021    Diabetic Alb to Cr ratio (uACR) test  01/21/2021    COVID-19 Vaccine (3 - Booster for Moderna series) 08/16/2021    Lipids  08/05/2022    GFR test (Diabetes, CKD 3-4, OR last GFR 15-59)  08/05/2022    A1C test (Diabetic or Prediabetic)  05/28/2023    Flu vaccine (1) Never done
without long-term current use of insulin (HCC)    Rib pain    Ketonuria    Left facial swelling    ADD (attention deficit disorder)    Facial abscess    Erectile dysfunction    Left-sided face pain     No past surgical history on file. Social History     Socioeconomic History    Marital status:      Spouse name: Not on file    Number of children: Not on file    Years of education: Not on file    Highest education level: Not on file   Occupational History    Not on file   Tobacco Use    Smoking status: Never    Smokeless tobacco: Never   Substance and Sexual Activity    Alcohol use: No    Drug use: No    Sexual activity: Not on file   Other Topics Concern    Not on file   Social History Narrative    Not on file     Social Determinants of Health     Financial Resource Strain: Low Risk  (9/5/2023)    Overall Financial Resource Strain (CARDIA)     Difficulty of Paying Living Expenses: Not hard at all   Food Insecurity: No Food Insecurity (9/5/2023)    Hunger Vital Sign     Worried About Running Out of Food in the Last Year: Never true     [address] in the Last Year: Never true   Transportation Needs: Unknown (9/5/2023)    PRAPARE - Transportation     Lack of Transportation (Medical): Not on file     Lack of Transportation (Non-Medical):  No   Physical Activity: Not on file   Stress: Not on file   Social Connections: Not on file   Intimate Partner Violence: Not on file   Housing Stability: Unknown (9/5/2023)    Housing Stability Vital Sign     Unable to Pay for Housing in the Last Year: Not on file     Number of Places Lived in the Last Year: Not on file     Unstable Housing in the Last Year: No     Family History   Problem Relation Age of Onset    Thyroid Disease Father     Thyroid Disease Mother     Psychiatric Disorder Mother     Hypertension Mother      Current Outpatient Medications   Medication Sig    Liraglutide (VICTOZA) 18 MG/3ML SOPN SC injection Inject 1.8 mg into the skin daily    empagliflozin

## 2024-02-05 ENCOUNTER — OFFICE VISIT (OUTPATIENT)
Facility: CLINIC | Age: 39
End: 2024-02-05
Payer: COMMERCIAL

## 2024-02-05 VITALS
TEMPERATURE: 98.4 F | SYSTOLIC BLOOD PRESSURE: 127 MMHG | BODY MASS INDEX: 30.93 KG/M2 | WEIGHT: 233.4 LBS | HEART RATE: 90 BPM | RESPIRATION RATE: 14 BRPM | OXYGEN SATURATION: 97 % | HEIGHT: 73 IN | DIASTOLIC BLOOD PRESSURE: 89 MMHG

## 2024-02-05 DIAGNOSIS — I10 ESSENTIAL (PRIMARY) HYPERTENSION: ICD-10-CM

## 2024-02-05 DIAGNOSIS — E11.9 TYPE 2 DIABETES MELLITUS WITHOUT COMPLICATION, WITHOUT LONG-TERM CURRENT USE OF INSULIN (HCC): Primary | ICD-10-CM

## 2024-02-05 LAB — HBA1C MFR BLD: 10.4 %

## 2024-02-05 PROCEDURE — 3074F SYST BP LT 130 MM HG: CPT | Performed by: FAMILY MEDICINE

## 2024-02-05 PROCEDURE — 99214 OFFICE O/P EST MOD 30 MIN: CPT | Performed by: FAMILY MEDICINE

## 2024-02-05 PROCEDURE — 83036 HEMOGLOBIN GLYCOSYLATED A1C: CPT | Performed by: FAMILY MEDICINE

## 2024-02-05 PROCEDURE — 3079F DIAST BP 80-89 MM HG: CPT | Performed by: FAMILY MEDICINE

## 2024-02-05 RX ORDER — METFORMIN HYDROCHLORIDE 750 MG/1
TABLET, EXTENDED RELEASE ORAL
Qty: 180 TABLET | Refills: 1 | Status: SHIPPED | OUTPATIENT
Start: 2024-02-05

## 2024-02-05 RX ORDER — DULAGLUTIDE 0.75 MG/.5ML
INJECTION, SOLUTION SUBCUTANEOUS
Qty: 4 ML | Refills: 1 | Status: SHIPPED | OUTPATIENT
Start: 2024-02-05

## 2024-02-05 ASSESSMENT — PATIENT HEALTH QUESTIONNAIRE - PHQ9
2. FEELING DOWN, DEPRESSED OR HOPELESS: 0
SUM OF ALL RESPONSES TO PHQ QUESTIONS 1-9: 0
SUM OF ALL RESPONSES TO PHQ9 QUESTIONS 1 & 2: 0
1. LITTLE INTEREST OR PLEASURE IN DOING THINGS: 0

## 2024-02-05 NOTE — PROGRESS NOTES
Chief Complaint   Patient presents with    Follow-up Chronic Condition         \"Have you been to the ER, urgent care clinic since your last visit?  Hospitalized since your last visit?\"    NO    “Have you seen or consulted any other health care providers outside of [facility] since your last visit?”    NO           Health Maintenance Due   Topic Date Due    Hepatitis B vaccine (1 of 3 - 3-dose series) Never done    Varicella vaccine (1 of 2 - 2-dose childhood series) Never done    Pneumococcal 0-64 years Vaccine (1 - PCV) Never done    HIV screen  Never done    Diabetic retinal exam  Never done    Hepatitis C screen  Never done    Diabetic foot exam  01/21/2021    Diabetic Alb to Cr ratio (uACR) test  01/21/2021    Flu vaccine (1) Never done    COVID-19 Vaccine (3 - 2023-24 season) 09/01/2023    A1C test (Diabetic or Prediabetic)  12/05/2023    Depression Screen  02/28/2024

## 2024-02-06 DIAGNOSIS — E11.9 TYPE 2 DIABETES MELLITUS WITHOUT COMPLICATION, WITHOUT LONG-TERM CURRENT USE OF INSULIN (HCC): ICD-10-CM

## 2024-02-08 ENCOUNTER — TELEPHONE (OUTPATIENT)
Facility: CLINIC | Age: 39
End: 2024-02-08

## 2024-02-08 LAB — TESTOST SERPL-MCNC: 331 NG/DL (ref 264–916)

## 2024-02-08 NOTE — PROGRESS NOTES
Progress Note    Patient: Riddhi Bond MRN: [id number]  SSN: [id number]    YOB: 1985  Age: 38 y.o.  Sex: male        Chief Complaint   Patient presents with    Follow-up Chronic Condition     he is a 38 y.o. year old male who presents for  follow up of chronic health conditions. Patient with hx of T2D, HTN, HLD and obesity. POC HgbA1c shows worsening T2D control. Patient reports problems with medication.     Encounter Diagnoses   Name Primary?    Type 2 diabetes mellitus without complication, without long-term current use of insulin (HCC) Yes    Essential (primary) hypertension      BP Readings from Last 3 Encounters:   02/05/24 127/89   09/05/23 (!) 131/92   02/28/23 133/83     Wt Readings from Last 3 Encounters:   02/05/24 105.9 kg (233 lb 6.4 oz)   09/05/23 109.7 kg (241 lb 12.8 oz)   02/28/23 108.9 kg (240 lb)     Body mass index is 30.79 kg/m².      Hemoglobin A1C, POC   Date Value Ref Range Status   02/05/2024 10.4 % Final       CMP:    Lab Results   Component Value Date/Time     09/05/2023 04:25 PM    K 4.5 09/05/2023 04:25 PM     09/05/2023 04:25 PM    CO2 27 09/05/2023 04:25 PM    BUN 17 09/05/2023 04:25 PM    CREATININE 0.92 09/05/2023 04:25 PM    GFRAA >60 08/05/2021 11:00 AM    AGRATIO 1.6 09/05/2023 04:25 PM    AGRATIO 1.5 08/05/2021 11:00 AM    LABGLOM >60 09/05/2023 04:25 PM    GLUCOSE 260 09/05/2023 04:25 PM    PROT 7.4 09/05/2023 04:25 PM    LABALBU 4.5 09/05/2023 04:25 PM    CALCIUM 10.1 09/05/2023 04:25 PM    BILITOT 0.5 09/05/2023 04:25 PM    ALKPHOS 72 09/05/2023 04:25 PM    ALKPHOS 65 08/05/2021 11:00 AM    AST 19 09/05/2023 04:25 PM    ALT 70 09/05/2023 04:25 PM     FLP:    Lab Results   Component Value Date/Time    TRIG 189 09/05/2023 04:25 PM    HDL 41 09/05/2023 04:25 PM    LDLCALC 124.2 09/05/2023 04:25 PM       Patient Active Problem List   Diagnosis    HTN (hypertension)    Hyperglycemia    Controlled type 2 diabetes mellitus without complication, without

## 2024-02-08 NOTE — TELEPHONE ENCOUNTER
Called patient, no answer left msg.  Advise patient per Dr Goins:\"Testosterone remains in reference range. We can trial medication if willing to do so.\"

## 2024-02-09 NOTE — TELEPHONE ENCOUNTER
Pt's contact called. She was advised of 's msg. She says Pt does want to try a medicaiton to see if it will help. Please advise.

## 2024-02-11 RX ORDER — SILDENAFIL 100 MG/1
TABLET, FILM COATED ORAL
Qty: 6 TABLET | Refills: 0 | Status: SHIPPED | OUTPATIENT
Start: 2024-02-11

## 2024-04-03 DIAGNOSIS — E11.9 TYPE 2 DIABETES MELLITUS WITHOUT COMPLICATION, WITHOUT LONG-TERM CURRENT USE OF INSULIN (HCC): ICD-10-CM

## 2024-04-03 RX ORDER — DULAGLUTIDE 0.75 MG/.5ML
INJECTION, SOLUTION SUBCUTANEOUS
Qty: 4 ML | Refills: 0 | Status: SHIPPED | OUTPATIENT
Start: 2024-04-03

## 2024-04-06 DIAGNOSIS — E11.9 TYPE 2 DIABETES MELLITUS WITHOUT COMPLICATION, WITHOUT LONG-TERM CURRENT USE OF INSULIN (HCC): ICD-10-CM

## 2024-04-09 RX ORDER — EMPAGLIFLOZIN 10 MG/1
10 TABLET, FILM COATED ORAL DAILY
Qty: 90 TABLET | Refills: 0 | Status: SHIPPED | OUTPATIENT
Start: 2024-04-09 | End: 2024-06-04

## 2024-04-24 RX ORDER — SILDENAFIL 100 MG/1
TABLET, FILM COATED ORAL
Qty: 6 TABLET | Refills: 3 | Status: SHIPPED | OUTPATIENT
Start: 2024-04-24

## 2024-06-03 DIAGNOSIS — E11.9 TYPE 2 DIABETES MELLITUS WITHOUT COMPLICATION, WITHOUT LONG-TERM CURRENT USE OF INSULIN (HCC): ICD-10-CM

## 2024-06-04 RX ORDER — EMPAGLIFLOZIN 10 MG/1
10 TABLET, FILM COATED ORAL DAILY
Qty: 90 TABLET | Refills: 0 | Status: SHIPPED | OUTPATIENT
Start: 2024-06-04

## 2024-06-04 RX ORDER — METFORMIN HYDROCHLORIDE 750 MG/1
TABLET, EXTENDED RELEASE ORAL
Qty: 180 TABLET | Refills: 0 | Status: SHIPPED | OUTPATIENT
Start: 2024-06-04

## 2024-06-04 RX ORDER — DULAGLUTIDE 0.75 MG/.5ML
INJECTION, SOLUTION SUBCUTANEOUS
Qty: 4 ML | Refills: 0 | Status: SHIPPED | OUTPATIENT
Start: 2024-06-04 | End: 2024-07-17

## 2024-07-13 DIAGNOSIS — E11.9 TYPE 2 DIABETES MELLITUS WITHOUT COMPLICATION, WITHOUT LONG-TERM CURRENT USE OF INSULIN (HCC): ICD-10-CM

## 2024-07-17 RX ORDER — DULAGLUTIDE 0.75 MG/.5ML
INJECTION, SOLUTION SUBCUTANEOUS
Qty: 4 ML | Refills: 0 | Status: SHIPPED | OUTPATIENT
Start: 2024-07-17

## 2024-08-30 DIAGNOSIS — E11.9 TYPE 2 DIABETES MELLITUS WITHOUT COMPLICATION, WITHOUT LONG-TERM CURRENT USE OF INSULIN (HCC): ICD-10-CM

## 2024-08-30 RX ORDER — ATORVASTATIN CALCIUM 20 MG/1
20 TABLET, FILM COATED ORAL DAILY
Qty: 90 TABLET | Refills: 0 | Status: SHIPPED | OUTPATIENT
Start: 2024-08-30

## 2024-08-30 RX ORDER — EMPAGLIFLOZIN 10 MG/1
10 TABLET, FILM COATED ORAL DAILY
Qty: 90 TABLET | Refills: 0 | Status: SHIPPED | OUTPATIENT
Start: 2024-08-30

## 2024-08-30 RX ORDER — DULAGLUTIDE 0.75 MG/.5ML
INJECTION, SOLUTION SUBCUTANEOUS
Qty: 4 ML | Refills: 0 | Status: SHIPPED | OUTPATIENT
Start: 2024-08-30

## 2024-08-30 RX ORDER — METFORMIN HYDROCHLORIDE 750 MG/1
TABLET, EXTENDED RELEASE ORAL
Qty: 180 TABLET | Refills: 0 | Status: SHIPPED | OUTPATIENT
Start: 2024-08-30

## 2024-09-03 ENCOUNTER — COMMUNITY OUTREACH (OUTPATIENT)
Facility: CLINIC | Age: 39
End: 2024-09-03

## 2024-09-03 NOTE — PROGRESS NOTES
Patient's HM shows they are overdue for A1C  CareEverywhere and  files searched  without success.

## 2024-09-06 ENCOUNTER — OFFICE VISIT (OUTPATIENT)
Facility: CLINIC | Age: 39
End: 2024-09-06
Payer: COMMERCIAL

## 2024-09-06 VITALS
SYSTOLIC BLOOD PRESSURE: 122 MMHG | OXYGEN SATURATION: 98 % | RESPIRATION RATE: 16 BRPM | BODY MASS INDEX: 31.14 KG/M2 | WEIGHT: 235 LBS | DIASTOLIC BLOOD PRESSURE: 84 MMHG | TEMPERATURE: 98 F | HEIGHT: 73 IN | HEART RATE: 78 BPM

## 2024-09-06 DIAGNOSIS — E11.9 TYPE 2 DIABETES MELLITUS WITHOUT COMPLICATION, WITHOUT LONG-TERM CURRENT USE OF INSULIN (HCC): Primary | ICD-10-CM

## 2024-09-06 DIAGNOSIS — E78.2 MIXED HYPERLIPIDEMIA: ICD-10-CM

## 2024-09-06 DIAGNOSIS — I10 ESSENTIAL (PRIMARY) HYPERTENSION: ICD-10-CM

## 2024-09-06 LAB — HBA1C MFR BLD: 7 %

## 2024-09-06 PROCEDURE — 3079F DIAST BP 80-89 MM HG: CPT | Performed by: FAMILY MEDICINE

## 2024-09-06 PROCEDURE — 99214 OFFICE O/P EST MOD 30 MIN: CPT | Performed by: FAMILY MEDICINE

## 2024-09-06 PROCEDURE — 3074F SYST BP LT 130 MM HG: CPT | Performed by: FAMILY MEDICINE

## 2024-09-06 PROCEDURE — 83036 HEMOGLOBIN GLYCOSYLATED A1C: CPT | Performed by: FAMILY MEDICINE

## 2024-09-06 RX ORDER — DULAGLUTIDE 0.75 MG/.5ML
INJECTION, SOLUTION SUBCUTANEOUS
Qty: 4 ML | Refills: 0 | Status: CANCELLED | OUTPATIENT
Start: 2024-09-06

## 2024-09-06 SDOH — ECONOMIC STABILITY: FOOD INSECURITY: WITHIN THE PAST 12 MONTHS, THE FOOD YOU BOUGHT JUST DIDN'T LAST AND YOU DIDN'T HAVE MONEY TO GET MORE.: NEVER TRUE

## 2024-09-06 SDOH — ECONOMIC STABILITY: FOOD INSECURITY: WITHIN THE PAST 12 MONTHS, YOU WORRIED THAT YOUR FOOD WOULD RUN OUT BEFORE YOU GOT MONEY TO BUY MORE.: NEVER TRUE

## 2024-09-06 SDOH — ECONOMIC STABILITY: INCOME INSECURITY: HOW HARD IS IT FOR YOU TO PAY FOR THE VERY BASICS LIKE FOOD, HOUSING, MEDICAL CARE, AND HEATING?: SOMEWHAT HARD

## 2024-09-07 LAB
ALBUMIN SERPL-MCNC: 4.8 G/DL (ref 3.5–5)
ALBUMIN/GLOB SERPL: 1.5 (ref 1.1–2.2)
ALP SERPL-CCNC: 70 U/L (ref 45–117)
ALT SERPL-CCNC: 45 U/L (ref 12–78)
ANION GAP SERPL CALC-SCNC: 5 MMOL/L (ref 2–12)
AST SERPL-CCNC: 18 U/L (ref 15–37)
BILIRUB SERPL-MCNC: 0.8 MG/DL (ref 0.2–1)
BUN SERPL-MCNC: 16 MG/DL (ref 6–20)
BUN/CREAT SERPL: 19 (ref 12–20)
CALCIUM SERPL-MCNC: 10.7 MG/DL (ref 8.5–10.1)
CHLORIDE SERPL-SCNC: 106 MMOL/L (ref 97–108)
CHOLEST SERPL-MCNC: 158 MG/DL
CO2 SERPL-SCNC: 29 MMOL/L (ref 21–32)
CREAT SERPL-MCNC: 0.85 MG/DL (ref 0.7–1.3)
GLOBULIN SER CALC-MCNC: 3.1 G/DL (ref 2–4)
GLUCOSE SERPL-MCNC: 143 MG/DL (ref 65–100)
HDLC SERPL-MCNC: 34 MG/DL
HDLC SERPL: 4.6 (ref 0–5)
LDLC SERPL CALC-MCNC: 83.6 MG/DL (ref 0–100)
POTASSIUM SERPL-SCNC: 4.4 MMOL/L (ref 3.5–5.1)
PROT SERPL-MCNC: 7.9 G/DL (ref 6.4–8.2)
SODIUM SERPL-SCNC: 140 MMOL/L (ref 136–145)
TRIGL SERPL-MCNC: 202 MG/DL
TSH SERPL DL<=0.05 MIU/L-ACNC: 1.84 UIU/ML (ref 0.36–3.74)
VLDLC SERPL CALC-MCNC: 40.4 MG/DL

## 2024-09-27 DIAGNOSIS — E11.9 TYPE 2 DIABETES MELLITUS WITHOUT COMPLICATION, WITHOUT LONG-TERM CURRENT USE OF INSULIN (HCC): ICD-10-CM

## 2024-09-29 RX ORDER — ATORVASTATIN CALCIUM 20 MG/1
20 TABLET, FILM COATED ORAL DAILY
Qty: 90 TABLET | Refills: 1 | Status: SHIPPED | OUTPATIENT
Start: 2024-09-29

## 2024-11-22 DIAGNOSIS — E11.9 TYPE 2 DIABETES MELLITUS WITHOUT COMPLICATION, WITHOUT LONG-TERM CURRENT USE OF INSULIN (HCC): ICD-10-CM

## 2024-11-25 RX ORDER — METFORMIN HYDROCHLORIDE 750 MG/1
TABLET, EXTENDED RELEASE ORAL
Qty: 180 TABLET | Refills: 0 | Status: SHIPPED | OUTPATIENT
Start: 2024-11-25

## 2024-12-26 DIAGNOSIS — E11.9 TYPE 2 DIABETES MELLITUS WITHOUT COMPLICATION, WITHOUT LONG-TERM CURRENT USE OF INSULIN (HCC): ICD-10-CM

## 2024-12-26 RX ORDER — EMPAGLIFLOZIN 10 MG/1
10 TABLET, FILM COATED ORAL DAILY
Qty: 90 TABLET | Refills: 0 | Status: SHIPPED | OUTPATIENT
Start: 2024-12-26

## 2025-01-16 DIAGNOSIS — E11.9 TYPE 2 DIABETES MELLITUS WITHOUT COMPLICATION, WITHOUT LONG-TERM CURRENT USE OF INSULIN (HCC): ICD-10-CM

## 2025-01-17 RX ORDER — DULAGLUTIDE 0.75 MG/.5ML
INJECTION, SOLUTION SUBCUTANEOUS
Qty: 4 ML | Refills: 0 | Status: SHIPPED | OUTPATIENT
Start: 2025-01-17

## 2025-02-17 DIAGNOSIS — E11.9 TYPE 2 DIABETES MELLITUS WITHOUT COMPLICATION, WITHOUT LONG-TERM CURRENT USE OF INSULIN: ICD-10-CM

## 2025-02-18 NOTE — TELEPHONE ENCOUNTER
Called patient, no answer left msg.   Advise diabetes follow up Office Visit needs to be scheduled with Dr Goins and for [place] paperwork. Letter sent.

## 2025-02-19 RX ORDER — DULAGLUTIDE 0.75 MG/.5ML
INJECTION, SOLUTION SUBCUTANEOUS
Qty: 4 ML | Refills: 0 | Status: SHIPPED | OUTPATIENT
Start: 2025-02-19 | End: 2025-03-17

## 2025-02-24 ENCOUNTER — OFFICE VISIT (OUTPATIENT)
Facility: CLINIC | Age: 40
End: 2025-02-24
Payer: COMMERCIAL

## 2025-02-24 ENCOUNTER — TELEPHONE (OUTPATIENT)
Facility: CLINIC | Age: 40
End: 2025-02-24

## 2025-02-24 VITALS
WEIGHT: 241 LBS | OXYGEN SATURATION: 97 % | SYSTOLIC BLOOD PRESSURE: 128 MMHG | TEMPERATURE: 97.7 F | HEART RATE: 93 BPM | HEIGHT: 73 IN | BODY MASS INDEX: 31.94 KG/M2 | RESPIRATION RATE: 16 BRPM | DIASTOLIC BLOOD PRESSURE: 87 MMHG

## 2025-02-24 DIAGNOSIS — I10 ESSENTIAL (PRIMARY) HYPERTENSION: Primary | ICD-10-CM

## 2025-02-24 DIAGNOSIS — E11.9 TYPE 2 DIABETES MELLITUS WITHOUT COMPLICATION, WITHOUT LONG-TERM CURRENT USE OF INSULIN (HCC): ICD-10-CM

## 2025-02-24 DIAGNOSIS — E78.2 MIXED HYPERLIPIDEMIA: ICD-10-CM

## 2025-02-24 PROCEDURE — 3078F DIAST BP <80 MM HG: CPT | Performed by: FAMILY MEDICINE

## 2025-02-24 PROCEDURE — 99214 OFFICE O/P EST MOD 30 MIN: CPT | Performed by: FAMILY MEDICINE

## 2025-02-24 PROCEDURE — 3074F SYST BP LT 130 MM HG: CPT | Performed by: FAMILY MEDICINE

## 2025-02-24 ASSESSMENT — PATIENT HEALTH QUESTIONNAIRE - PHQ9
2. FEELING DOWN, DEPRESSED OR HOPELESS: NOT AT ALL
SUM OF ALL RESPONSES TO PHQ QUESTIONS 1-9: 0
1. LITTLE INTEREST OR PLEASURE IN DOING THINGS: NOT AT ALL
SUM OF ALL RESPONSES TO PHQ QUESTIONS 1-9: 0
SUM OF ALL RESPONSES TO PHQ QUESTIONS 1-9: 0
SUM OF ALL RESPONSES TO PHQ9 QUESTIONS 1 & 2: 0
SUM OF ALL RESPONSES TO PHQ QUESTIONS 1-9: 0

## 2025-02-24 NOTE — TELEPHONE ENCOUNTER
Called patient, spoke with Erum, wife. She  was advised [facility] paperwork may be picked up at . Verbalized understanding.

## 2025-02-24 NOTE — PROGRESS NOTES
Chief Complaint   Patient presents with    Forms     FMLA    Follow-up Chronic Condition        \"Have you been to the ER, urgent care clinic since your last visit?  Hospitalized since your last visit?\"    NO    “Have you seen or consulted any other health care providers outside of [facility] since your last visit?”    NO            Click Here for Release of Records Request     Health Maintenance Due   Topic Date Due    Varicella vaccine (1 of 2 - 13+ 2-dose series) Never done    HIV screen  Never done    Diabetic retinal exam  Never done    Hepatitis C screen  Never done    Hepatitis B vaccine (1 of 3 - 19+ 3-dose series) Never done    Pneumococcal 0-49 years Vaccine (1 of 2 - PCV) Never done    Diabetic foot exam  01/21/2021    Diabetic Alb to Cr ratio (uACR) test  01/21/2021    Flu vaccine (1) Never done    COVID-19 Vaccine (3 - 2024-25 season) 09/01/2024    Depression Screen  02/05/2025       
insulin (HCC)    Rib pain    Ketonuria    Left facial swelling    ADD (attention deficit disorder)    Facial abscess    Erectile dysfunction    Left-sided face pain     History reviewed. No pertinent surgical history.  Social History     Socioeconomic History    Marital status:      Spouse name: Not on file    Number of children: Not on file    Years of education: Not on file    Highest education level: Not on file   Occupational History    Not on file   Tobacco Use    Smoking status: Never     Passive exposure: Never    Smokeless tobacco: Never   Substance and Sexual Activity    Alcohol use: No    Drug use: No    Sexual activity: Yes     Partners: Female   Other Topics Concern    Not on file   Social History Narrative    Not on file     Social Determinants of Health     Financial Resource Strain: Medium Risk (9/6/2024)    Overall Financial Resource Strain (CARDIA)     Difficulty of Paying Living Expenses: Somewhat hard   Food Insecurity: Not on file (1/31/2025)   Transportation Needs: Unknown (9/6/2024)    PRAPARE - Transportation     Lack of Transportation (Medical): Not on file     Lack of Transportation (Non-Medical): No   Physical Activity: Not on file   Stress: Not on file   Social Connections: Not on file   Intimate Partner Violence: Not on file   Housing Stability: Unknown (1/31/2025)    Housing Stability Vital Sign     Unable to Pay for Housing in the Last Year: Not on file     Number of Times Moved in the Last Year: 0     Homeless in the Last Year: No     Family History   Problem Relation Age of Onset    Thyroid Disease Father     Thyroid Disease Mother     Psychiatric Disorder Mother     Hypertension Mother      Current Outpatient Medications   Medication Sig    TRULICITY 0.75 MG/0.5ML SOAJ SC injection INJECT 0.75MG UNDER THE SKIN ONCE PER WEEK    JARDIANCE 10 MG tablet Take 1 tablet by mouth once daily    metFORMIN (GLUCOPHAGE-XR) 750 MG extended release tablet TAKE 1 TABLET BY MOUTH BEFORE 
(3) no apparent problem

## 2025-02-26 LAB
ALBUMIN SERPL-MCNC: 4.4 G/DL (ref 3.5–5)
ALBUMIN/GLOB SERPL: 1.4 (ref 1.1–2.2)
ALP SERPL-CCNC: 78 U/L (ref 45–117)
ALT SERPL-CCNC: 88 U/L (ref 12–78)
ANION GAP SERPL CALC-SCNC: 6 MMOL/L (ref 2–12)
AST SERPL-CCNC: 35 U/L (ref 15–37)
BILIRUB SERPL-MCNC: 1.1 MG/DL (ref 0.2–1)
BUN SERPL-MCNC: 14 MG/DL (ref 6–20)
BUN/CREAT SERPL: 18 (ref 12–20)
CALCIUM SERPL-MCNC: 10.4 MG/DL (ref 8.5–10.1)
CHLORIDE SERPL-SCNC: 102 MMOL/L (ref 97–108)
CHOLEST SERPL-MCNC: 167 MG/DL
CO2 SERPL-SCNC: 29 MMOL/L (ref 21–32)
CREAT SERPL-MCNC: 0.79 MG/DL (ref 0.7–1.3)
ERYTHROCYTE [DISTWIDTH] IN BLOOD BY AUTOMATED COUNT: 11.7 % (ref 11.5–14.5)
EST. AVERAGE GLUCOSE BLD GHB EST-MCNC: 180 MG/DL
GLOBULIN SER CALC-MCNC: 3.2 G/DL (ref 2–4)
GLUCOSE SERPL-MCNC: 174 MG/DL (ref 65–100)
HBA1C MFR BLD: 7.9 % (ref 4–5.6)
HCT VFR BLD AUTO: 51.4 % (ref 36.6–50.3)
HDLC SERPL-MCNC: 41 MG/DL
HDLC SERPL: 4.1 (ref 0–5)
HGB BLD-MCNC: 17.1 G/DL (ref 12.1–17)
LDLC SERPL CALC-MCNC: 101 MG/DL (ref 0–100)
MCH RBC QN AUTO: 31.3 PG (ref 26–34)
MCHC RBC AUTO-ENTMCNC: 33.3 G/DL (ref 30–36.5)
MCV RBC AUTO: 94 FL (ref 80–99)
NRBC # BLD: 0 K/UL (ref 0–0.01)
NRBC BLD-RTO: 0 PER 100 WBC
PLATELET # BLD AUTO: 325 K/UL (ref 150–400)
PMV BLD AUTO: 10.6 FL (ref 8.9–12.9)
POTASSIUM SERPL-SCNC: 4.2 MMOL/L (ref 3.5–5.1)
PROT SERPL-MCNC: 7.6 G/DL (ref 6.4–8.2)
RBC # BLD AUTO: 5.47 M/UL (ref 4.1–5.7)
SODIUM SERPL-SCNC: 137 MMOL/L (ref 136–145)
TRIGL SERPL-MCNC: 125 MG/DL
TSH SERPL DL<=0.05 MIU/L-ACNC: 1.36 UIU/ML (ref 0.36–3.74)
VLDLC SERPL CALC-MCNC: 25 MG/DL
WBC # BLD AUTO: 7.7 K/UL (ref 4.1–11.1)

## 2025-03-15 DIAGNOSIS — E11.9 TYPE 2 DIABETES MELLITUS WITHOUT COMPLICATION, WITHOUT LONG-TERM CURRENT USE OF INSULIN (HCC): ICD-10-CM

## 2025-03-17 RX ORDER — DULAGLUTIDE 0.75 MG/.5ML
INJECTION, SOLUTION SUBCUTANEOUS
Qty: 4 ML | Refills: 1 | Status: SHIPPED | OUTPATIENT
Start: 2025-03-17

## 2025-03-26 DIAGNOSIS — E11.9 TYPE 2 DIABETES MELLITUS WITHOUT COMPLICATION, WITHOUT LONG-TERM CURRENT USE OF INSULIN: ICD-10-CM

## 2025-03-27 RX ORDER — EMPAGLIFLOZIN 10 MG/1
10 TABLET, FILM COATED ORAL DAILY
Qty: 90 TABLET | Refills: 0 | Status: SHIPPED | OUTPATIENT
Start: 2025-03-27